# Patient Record
Sex: FEMALE | Race: WHITE | NOT HISPANIC OR LATINO | Employment: OTHER | URBAN - METROPOLITAN AREA
[De-identification: names, ages, dates, MRNs, and addresses within clinical notes are randomized per-mention and may not be internally consistent; named-entity substitution may affect disease eponyms.]

---

## 2020-02-10 NOTE — PROGRESS NOTES
[facility] Cardiology    Subjective:     Encounter Date:02/12/2020         Patient ID: Maryana Freeman is a 64 y.o. female.  Referring provider: No ref. provider found     Reason for consultation:   Chief Complaint   Patient presents with   • Atrial Fibrillation      Problem List:  1. Permanent atrial fibrillation, asymptomatic   a. Diagnosed 2014, incidentally noted.  b. Failed ECV, 6/2014.  c. Rate-controlled with Coreg.  d. CHADSVASC 4, on Xarelto.   2. Non ischemic cardiomyopathy  a. Echocardiogram April 2014: moderate LV dilation, mild to moderately rduced EF of 35-40%, global hypokinesis, left atrial size normal.   b. Lexiscan Cardiolite 4/2014: no ischemia.   c. Started on Coreg, Losartan, 4/2014.     No Known Allergies      Current Outpatient Medications:   •  carvedilol (COREG) 25 MG tablet, Take 25 mg by mouth 2 (Two) Times a Day With Meals., Disp: , Rfl:   •  losartan (COZAAR) 50 MG tablet, Take 50 mg by mouth Daily., Disp: , Rfl:   •  rivaroxaban (XARELTO) 20 MG tablet, Take 20 mg by mouth Daily., Disp: , Rfl:     HPI:      Maryana Freeman is a 64 y.o. female who present today to establish care for her NICMP and permanent atrial fibrillation. She recently lost her  and moved to [place]. Looking to establish care locally. Regarding Afib, she is rate-controlled with Coreg and Anticoagulated with Xarelto. She is asymptomatic from atrial fibrillation. She was started on Coreg and Losartan in 2014 after being noted to have NCIMP. Her last echocardiogram was in 2014.  She rides stationary bikes, light weight resistance 3 days a week, about 30 minutes or more.       Social History     Socioeconomic History   • Marital status:      Spouse name: Not on file   • Number of children: Not on file   • Years of education: Not on file   • Highest education level: Not on file   Tobacco Use   • Smoking status: Never Smoker   • Smokeless tobacco: Never Used   Substance and  Sexual Activity   • Alcohol use: Yes     Alcohol/week: 2.0 - 3.0 standard drinks     Types: 2 - 3 Standard drinks or equivalent per week     Drinks per session: 1 or 2     Binge frequency: Less than monthly   • Sexual activity: Defer     Family History   Problem Relation Age of Onset   • Alzheimer's disease Mother    • No Known Problems Father      Mother- Afib, PPM for SSS  Sister- Afib.     Review of Systems:  The following portions of the patient's history were reviewed and updated as appropriate: allergies, current medications and problem list.    Constitution: Negative for chills, fatigue, fever, generalized weakness, weight gain and weight loss.   Cardiovascular: Negative for chest pain, claudication, dyspnea on exertion, leg swelling, orthopnea, palpitations, paroxysmal nocturnal dyspnea and syncope.   Respiratory: Negative for cough, shortness of breath, and wheezing.  HENT: Negative for ear pain, nosebleeds, and tinnitus.  Gastrointestinal: Negative for abdominal pain, constipation, diarrhea, nausea and vomiting.   Genitourinary: No urinary symptoms   Musculoskeletal: Negative for muscle cramps.  Neurological: Negative for dizziness, headaches, loss of balance, numbness, and symptoms of stroke.  Psychiatric: Normal mental status.         Objective:     Vitals:    02/12/20 1352   BP: 136/86   Pulse: 77   SpO2: 98%     GENERAL: This is a well-developed, well-nourished, female who is in no acute distress. Alert and oriented x3. Normal mood and affect.   SKIN: Pink and warm without rash or abnormality noted.   HEENT: Head is normocephalic and atraumatic. Pupils are equal and reactive to light bilaterally. Mucous membranes are pink and moist.   NECK: Supple without lymphadenopathy or thyromegaly. There is no jugular venous distention at 30°.  LUNGS: Clear to auscultation bilaterally without wheezing, rhonchi, or rales noted.   CARDIOVASCULAR: The heart has a regular rate with a normal S1 and S2. There is no  murmur, gallop, rub, or click appreciated. The PMI is nondisplaced. Carotid upstrokes are 2+ and symmetrical without bruits.   ABDOMEN: Soft and nondistended with positive bowel sounds x4. The patient denies tenderness of palpitation.   MUSCULOSKELETAL: There are no obvious bony abnormalities. Normal range of tenderness to palpation.   NEUROLOGICAL: Nonfocal.   PERIPHERAL VASCULAR: Femoral pulses are 2+ and symmetrical without bruits. Posterior tibial and dorsalis pedis pulses are 2+ and symmetrical. There is no peripheral edema.     No results found for any previous visit.         Diagnostic Data:          ECG 12 Lead  Date/Time: 2/12/2020 2:13 PM  Performed by: Maylin Solares APRN  Authorized by: Maylin Solares APRN   Previous ECG: no previous ECG available  Rhythm: atrial fibrillation  BPM: 77                  Assessment:       ICD-10-CM ICD-9-CM   1. Atrial fibrillation, chronic I48.20 427.31   2. Non-ischemic cardiomyopathy (CMS/HCC) I42.8 425.4          Plan:     1. Continue Xarelto for stroke prevention  2. Continue Coreg for Afib  3. Continue Coreg, Losartan for NICMP.  4. Repeat echocardiogram to reassess LVEF as her last echo was in 2014.  5. Follow-up in 12 months sooner as needed.      Scribed for Brenda Rodgers MD by CORAL Hassan. 2/12/2020  2:12 PM     I Brenda Rodgers MD personally performed the services described in this documentation as scribed by the above individual in my presence, and it is both accurate and complete.    Brenda Rodgers MD, [facility]

## 2020-02-12 ENCOUNTER — CONSULT (OUTPATIENT)
Dept: CARDIOLOGY | Facility: CLINIC | Age: 65
End: 2020-02-12

## 2020-02-12 VITALS
HEIGHT: 64 IN | SYSTOLIC BLOOD PRESSURE: 136 MMHG | BODY MASS INDEX: 37.9 KG/M2 | HEART RATE: 77 BPM | OXYGEN SATURATION: 98 % | WEIGHT: 222 LBS | DIASTOLIC BLOOD PRESSURE: 86 MMHG

## 2020-02-12 DIAGNOSIS — I42.8 NON-ISCHEMIC CARDIOMYOPATHY (HCC): ICD-10-CM

## 2020-02-12 DIAGNOSIS — I48.20 ATRIAL FIBRILLATION, CHRONIC (HCC): Primary | ICD-10-CM

## 2020-02-12 PROCEDURE — 99204 OFFICE O/P NEW MOD 45 MIN: CPT | Performed by: INTERNAL MEDICINE

## 2020-02-12 PROCEDURE — 93000 ELECTROCARDIOGRAM COMPLETE: CPT | Performed by: NURSE PRACTITIONER

## 2020-02-12 RX ORDER — LOSARTAN POTASSIUM 50 MG/1
50 TABLET ORAL DAILY
COMMUNITY
End: 2020-04-15 | Stop reason: SDUPTHER

## 2020-02-12 RX ORDER — CARVEDILOL 25 MG/1
25 TABLET ORAL 2 TIMES DAILY WITH MEALS
COMMUNITY
End: 2020-02-25 | Stop reason: SDUPTHER

## 2020-02-19 ENCOUNTER — HOSPITAL ENCOUNTER (OUTPATIENT)
Dept: CARDIOLOGY | Facility: HOSPITAL | Age: 65
Discharge: HOME | End: 2020-02-19
Admitting: NURSE PRACTITIONER

## 2020-02-19 VITALS — BODY MASS INDEX: 37.9 KG/M2 | HEIGHT: 64 IN | WEIGHT: 222 LBS

## 2020-02-19 DIAGNOSIS — I42.8 NON-ISCHEMIC CARDIOMYOPATHY (HCC): ICD-10-CM

## 2020-02-19 PROCEDURE — 93306 TTE W/DOPPLER COMPLETE: CPT | Performed by: INTERNAL MEDICINE

## 2020-02-19 PROCEDURE — 93306 TTE W/DOPPLER COMPLETE: CPT

## 2020-02-22 LAB
BH CV ECHO MEAS - AO MAX PG (FULL): 3.1 MMHG
BH CV ECHO MEAS - AO MAX PG: 5 MMHG
BH CV ECHO MEAS - AO MEAN PG (FULL): 2 MMHG
BH CV ECHO MEAS - AO MEAN PG: 3 MMHG
BH CV ECHO MEAS - AO V2 MAX: 107 CM/SEC
BH CV ECHO MEAS - AO V2 MEAN: 75.7 CM/SEC
BH CV ECHO MEAS - AO V2 VTI: 23.3 CM
BH CV ECHO MEAS - AVA(I,A): 2.7 CM^2
BH CV ECHO MEAS - AVA(I,D): 2.7 CM^2
BH CV ECHO MEAS - AVA(V,A): 2.7 CM^2
BH CV ECHO MEAS - AVA(V,D): 2.7 CM^2
BH CV ECHO MEAS - BSA(HAYCOCK): 2.2 M^2
BH CV ECHO MEAS - BSA: 2 M^2
BH CV ECHO MEAS - BZI_BMI: 39.3 KILOGRAMS/M^2
BH CV ECHO MEAS - BZI_METRIC_HEIGHT: 160 CM
BH CV ECHO MEAS - BZI_METRIC_WEIGHT: 100.7 KG
BH CV ECHO MEAS - EDV(CUBED): 132.7 ML
BH CV ECHO MEAS - EDV(MOD-SP2): 84 ML
BH CV ECHO MEAS - EDV(MOD-SP4): 97 ML
BH CV ECHO MEAS - EDV(TEICH): 123.8 ML
BH CV ECHO MEAS - EF(CUBED): 75.1 %
BH CV ECHO MEAS - EF(MOD-BP): 60 %
BH CV ECHO MEAS - EF(MOD-SP2): 60.7 %
BH CV ECHO MEAS - EF(MOD-SP4): 58.8 %
BH CV ECHO MEAS - EF(TEICH): 66.7 %
BH CV ECHO MEAS - ESV(CUBED): 33.1 ML
BH CV ECHO MEAS - ESV(MOD-SP2): 33 ML
BH CV ECHO MEAS - ESV(MOD-SP4): 40 ML
BH CV ECHO MEAS - ESV(TEICH): 41.3 ML
BH CV ECHO MEAS - FS: 37.1 %
BH CV ECHO MEAS - IVS/LVPW: 1
BH CV ECHO MEAS - IVSD: 1 CM
BH CV ECHO MEAS - LA DIMENSION: 4.1 CM
BH CV ECHO MEAS - LAD MAJOR: 5.9 CM
BH CV ECHO MEAS - LAT PEAK E' VEL: 11.5 CM/SEC
BH CV ECHO MEAS - LATERAL E/E' RATIO: 7.8
BH CV ECHO MEAS - LV DIASTOLIC VOL/BSA (35-75): 48 ML/M^2
BH CV ECHO MEAS - LV MASS(C)D: 187 GRAMS
BH CV ECHO MEAS - LV MASS(C)DI: 92.5 GRAMS/M^2
BH CV ECHO MEAS - LV MAX PG: 1.9 MMHG
BH CV ECHO MEAS - LV MEAN PG: 1 MMHG
BH CV ECHO MEAS - LV SYSTOLIC VOL/BSA (12-30): 19.8 ML/M^2
BH CV ECHO MEAS - LV V1 MAX: 68.4 CM/SEC
BH CV ECHO MEAS - LV V1 MEAN: 47.2 CM/SEC
BH CV ECHO MEAS - LV V1 VTI: 15.1 CM
BH CV ECHO MEAS - LVIDD: 5.1 CM
BH CV ECHO MEAS - LVIDS: 3.2 CM
BH CV ECHO MEAS - LVLD AP2: 7.2 CM
BH CV ECHO MEAS - LVLD AP4: 7 CM
BH CV ECHO MEAS - LVLS AP2: 5.5 CM
BH CV ECHO MEAS - LVLS AP4: 5.8 CM
BH CV ECHO MEAS - LVOT AREA (M): 4.2 CM^2
BH CV ECHO MEAS - LVOT AREA: 4.2 CM^2
BH CV ECHO MEAS - LVOT DIAM: 2.3 CM
BH CV ECHO MEAS - LVPWD: 0.98 CM
BH CV ECHO MEAS - MED PEAK E' VEL: 6 CM/SEC
BH CV ECHO MEAS - MEDIAL E/E' RATIO: 14.9
BH CV ECHO MEAS - MV DEC TIME: 0.2 SEC
BH CV ECHO MEAS - MV E MAX VEL: 89.8 CM/SEC
BH CV ECHO MEAS - PA ACC SLOPE: 728 CM/SEC^2
BH CV ECHO MEAS - PA ACC TIME: 0.11 SEC
BH CV ECHO MEAS - PA MAX PG: 2.7 MMHG
BH CV ECHO MEAS - PA PR(ACCEL): 30 MMHG
BH CV ECHO MEAS - PA V2 MAX: 82.8 CM/SEC
BH CV ECHO MEAS - RAP SYSTOLE: 3 MMHG
BH CV ECHO MEAS - RVSP: 28 MMHG
BH CV ECHO MEAS - SI(CUBED): 49.3 ML/M^2
BH CV ECHO MEAS - SI(LVOT): 31 ML/M^2
BH CV ECHO MEAS - SI(MOD-SP2): 25.2 ML/M^2
BH CV ECHO MEAS - SI(MOD-SP4): 28.2 ML/M^2
BH CV ECHO MEAS - SI(TEICH): 40.8 ML/M^2
BH CV ECHO MEAS - SV(CUBED): 99.6 ML
BH CV ECHO MEAS - SV(LVOT): 62.7 ML
BH CV ECHO MEAS - SV(MOD-SP2): 51 ML
BH CV ECHO MEAS - SV(MOD-SP4): 57 ML
BH CV ECHO MEAS - SV(TEICH): 82.5 ML
BH CV ECHO MEAS - TAPSE (>1.6): 3.5 CM2
BH CV ECHO MEAS - TR MAX PG: 25 MMHG
BH CV ECHO MEAS - TR MAX VEL: 247.7 CM/SEC
BH CV ECHO MEASUREMENTS AVERAGE E/E' RATIO: 10.26
BH CV VAS BP RIGHT ARM: NORMAL MMHG
BH CV XLRA - RV BASE: 4 CM
BH CV XLRA - RV LENGTH: 6.1 CM
BH CV XLRA - RV MID: 3.8 CM
BH CV XLRA - TDI S': 16.3 CM/SEC
LEFT ATRIUM VOLUME INDEX: 31.7 ML/M^2
LEFT ATRIUM VOLUME: 64 ML
LV EF 2D ECHO EST: 55 %

## 2020-02-25 RX ORDER — CARVEDILOL 25 MG/1
25 TABLET ORAL 2 TIMES DAILY WITH MEALS
Qty: 180 TABLET | Refills: 3 | Status: SHIPPED | OUTPATIENT
Start: 2020-02-25 | End: 2021-02-03 | Stop reason: SDUPTHER

## 2020-04-15 RX ORDER — LOSARTAN POTASSIUM 50 MG/1
50 TABLET ORAL DAILY
Qty: 90 TABLET | Refills: 3 | Status: SHIPPED | OUTPATIENT
Start: 2020-04-15 | End: 2021-04-30 | Stop reason: SDUPTHER

## 2021-02-04 RX ORDER — CARVEDILOL 25 MG/1
25 TABLET ORAL 2 TIMES DAILY WITH MEALS
Qty: 180 TABLET | Refills: 0 | Status: SHIPPED | OUTPATIENT
Start: 2021-02-04 | End: 2021-04-30 | Stop reason: SDUPTHER

## 2021-03-11 ENCOUNTER — OFFICE VISIT (OUTPATIENT)
Dept: CARDIOLOGY | Facility: CLINIC | Age: 66
End: 2021-03-11

## 2021-03-11 VITALS
HEIGHT: 64 IN | BODY MASS INDEX: 36.5 KG/M2 | TEMPERATURE: 97.5 F | DIASTOLIC BLOOD PRESSURE: 76 MMHG | WEIGHT: 213.8 LBS | SYSTOLIC BLOOD PRESSURE: 140 MMHG | HEART RATE: 78 BPM | OXYGEN SATURATION: 100 %

## 2021-03-11 DIAGNOSIS — I48.20 ATRIAL FIBRILLATION, CHRONIC (HCC): Primary | ICD-10-CM

## 2021-03-11 DIAGNOSIS — I42.8 NON-ISCHEMIC CARDIOMYOPATHY (HCC): ICD-10-CM

## 2021-03-11 PROCEDURE — 99214 OFFICE O/P EST MOD 30 MIN: CPT | Performed by: INTERNAL MEDICINE

## 2021-03-11 NOTE — PROGRESS NOTES
"[facility] Cardiology    Patient ID: Maryana Freeman is a 65 y.o. female.  : 1955   Contact: [phone]    Encounter date: 2021    PCP: Astrid Smalls MD      Chief complaint:   Chief Complaint   Patient presents with   • Non ischemic cardiomyopathy       Problem List:  1. Permanent atrial fibrillation, asymptomatic   a. Diagnosed , incidentally noted.  b. Failed ECV, 2014.  c. Rate-controlled with Coreg.  d. CHADSVASC 4, on Xarelto.   2. Non ischemic cardiomyopathy  a. Echocardiogram 2014: moderate LV dilation, mild to moderately rduced EF of 35-40%, global hypokinesis, left atrial size normal.   b. Lexiscan Cardiolite 2014: no ischemia.   c. Started on Coreg, Losartan, 2014.  d. Echocardiogram,  2020: EF: 55%, mild MR and TR.     No Known Allergies    Current Medications:    Current Outpatient Medications:   •  carvedilol (COREG) 25 MG tablet, Take 1 tablet by mouth 2 (Two) Times a Day With Meals., Disp: 180 tablet, Rfl: 0  •  losartan (COZAAR) 50 MG tablet, Take 1 tablet by mouth Daily., Disp: 90 tablet, Rfl: 3  •  rivaroxaban (XARELTO) 20 MG tablet, Take 1 tablet by mouth Daily., Disp: 90 tablet, Rfl: 3    HPI    Maryana Freeman is a 65 y.o. female who presents today for an annual follow up of AFIB and non-ischemic cardiomyopathy. Since last visit, her BP has been \"higher than normal\" since she has stopped exercising. She notes that she used to walk regularly and exercise at the [facility] 3 times per week and plans to begin this routine again. Patient otherwise denies chest pain, shortness of breath, PND, edema, palpitations, syncope, or presyncope at this time.       The following portions of the patient's history were reviewed and updated as appropriate: allergies, current medications and problem list.    Pertinent positives as listed in the HPI.  All other systems reviewed are negative.         Vitals:    21 0915   BP: 140/76   BP " "Location: Left arm   Patient Position: Sitting   Pulse: 78   Temp: 97.5 °F (36.4 °C)   SpO2: 100%   Weight: 97 kg (213 lb 12.8 oz)   Height: 162.6 cm (64\")       Physical Exam:  General: Alert and oriented.  Neck: Jugular venous pressure is within normal limits. Carotids have normal upstrokes without bruits.   Cardiovascular: Heart has a nondisplaced focal PMI. Irregular rate and rhythm. No murmur, gallop or rub.  Lungs: Clear, no rales or wheezes. Equal expansion is noted.   Extremities: Show no edema.  Skin: Warm and dry.  Neurologic: Nonfocal.     Diagnostic Data (reviewed with patient):  No recent laboratory studies to review.     Procedures    Advance Care Planning   ACP discussion was held with the patient during this visit. Patient has an advance directive in EMR which is still valid.       Assessment:    ICD-10-CM ICD-9-CM   1. Atrial fibrillation, chronic (CMS/HCC)  I48.20 427.31   2. Non-ischemic cardiomyopathy (CMS/[facility])  I42.8 425.4         Plan:  1. Begin routine aerobic exercise for at least 30 minutes 5 days per week.   2. Begin checking BP at home and report levels to office. If systolic pressure remains above 140 torr consider increasing losartan to 100 mg.    3. Continue carvedilol 25mg for AFIB and cardiomyopathy.  4. Continue losartan 50mg for hypertension.   5. Continue Xarelto for stroke prophylaxis.   6. Continue all other current medications.  7. F/up in 12 months, sooner if needed.    Scribed for Brenda Rodgers MD by Diaz Hodge. 3/11/2021  09:38 EST     I Brenda Rodgers MD personally performed the services described in this documentation as scribed by the above individual in my presence, and it is both accurate and complete.    Brenda Rodgers MD, FACC              "

## 2021-05-03 RX ORDER — LOSARTAN POTASSIUM 50 MG/1
50 TABLET ORAL DAILY
Qty: 90 TABLET | Refills: 3 | Status: SHIPPED | OUTPATIENT
Start: 2021-05-03 | End: 2022-03-23

## 2021-05-03 RX ORDER — CARVEDILOL 25 MG/1
25 TABLET ORAL 2 TIMES DAILY WITH MEALS
Qty: 180 TABLET | Refills: 0 | Status: SHIPPED | OUTPATIENT
Start: 2021-05-03 | End: 2021-07-26

## 2021-07-26 NOTE — TELEPHONE ENCOUNTER
Labs requested from PCP - labs received are from 3/2020 - will need repeat labs  Next appt 3/2022   Preventing Falls: After Your Visit Your Care Instructions Getting around your home safely can be a challenge if you have injuries or health problems that make it easy for you to fall. Loose rugs and furniture in walkways are among the dangers for many older people who have problems walking or who have poor eyesight. People who have conditions such as arthritis, osteoporosis, or dementia also have to be careful not to fall. You can make your home safer with a few simple measures. Follow-up care is a key part of your treatment and safety. Be sure to make and go to all appointments, and call your doctor if you are having problems. It's also a good idea to know your test results and keep a list of the medicines you take. How can you care for yourself at home? Taking care of yourself You may get dizzy if you do not drink enough water. To prevent dehydration, drink plenty of fluids, enough so that your urine is light yellow or clear like water. Choose water and other caffeine-free clear liquids. If you have kidney, heart, or liver disease and have to limit fluids, talk with your doctor before you increase the amount of fluids you drink. Exercise regularly to improve your strength, muscle tone, and balance. Walk if you can. Swimming may be a good choice if you cannot walk easily. Have your vision and hearing checked each year or any time you notice a change. If you have trouble seeing and hearing, you might not be able to avoid objects and could lose your balance. Know the side effects of the medicines you take. Ask your doctor or pharmacist whether the medicines you take can affect your balance. Sleeping pills or sedatives can affect your balance. Limit the amount of alcohol you drink. Alcohol can impair your balance and other senses. Ask your doctor whether calluses or corns on your feet need to be removed.  If you wear loose-fitting shoes because of calluses or corns, you can lose your balance and fall. Talk to your doctor if you have numbness in your feet. Preventing falls at home Remove raised doorway thresholds, throw rugs, and clutter. Repair loose carpet or raised areas in the floor. Move furniture and electrical cords to keep them out of walking paths. Use nonskid floor wax, and wipe up spills right away, especially on ceramic tile floors. If you use a walker or cane, put rubber tips on it. If you use crutches, clean the bottoms of them regularly with an abrasive pad, such as steel wool. Keep your house well lit, especially stairways, porches, and outside walkways. Use night-lights in areas such as hallways and bathrooms. Add extra light switches or use remote switches (such as switches that go on or off when you clap your hands) to make it easier to turn lights on if you have to get up during the night. Install sturdy handrails on stairways. Move items in your cabinets so that the things you use a lot are on the lower shelves (about waist level). Keep a cordless phone and a flashlight with new batteries by your bed. If possible, put a phone in each of the main rooms of your house, or carry a cell phone in case you fall and cannot reach a phone. Or, you can wear a device around your neck or wrist. You push a button that sends a signal for help. Wear low-heeled shoes that fit well and give your feet good support. Use footwear with nonskid soles. Check the heels and soles of your shoes for wear. Repair or replace worn heels or soles. Do not wear socks without shoes on wood floors. Walk on the grass when the sidewalks are slippery. If you live in an area that gets snow and ice in the winter, sprinkle salt on slippery steps and sidewalks. Preventing falls in the bath Install grab bars and nonskid mats inside and outside your shower or tub and near the toilet and sinks. Use shower chairs and bath benches. Use a hand-held shower head that will allow you to sit while showering. Get into a tub or shower by putting the weaker leg in first. Get out of a tub or shower with your strong side first.  
Repair loose toilet seats and consider installing a raised toilet seat to make getting on and off the toilet easier. Keep your bathroom door unlocked while you are in the shower. Where can you learn more? Go to [employer] Enter [phone] in the search box to learn more about \"Preventing Falls: After Your Visit. \"   
© 4060-3634 Healthwise, [employer]orporated. Care instructions adapted under license by [employer] (which disclaims liability or warranty for this information). This care instruction is for use with your licensed healthcare professional. If you have questions about a medical condition or this instruction, always ask your healthcare professional. [place] [address] any warranty or liability for your use of this information. Content Version: 10.1.615376; Current as of: May 15, 2013 Thank you for choosing [employer] and [facility] for your  
 
care. You may receive a survey about your visit. We appreciate you taking time  
 
to complete this survey as we use your feedback to improve our services. We  
 
realize we are not perfect, but we strive to provide excellent care.

## 2021-07-27 RX ORDER — CARVEDILOL 25 MG/1
TABLET ORAL
Qty: 180 TABLET | Refills: 2 | Status: SHIPPED | OUTPATIENT
Start: 2021-07-27 | End: 2022-03-23 | Stop reason: SDUPTHER

## 2022-03-23 ENCOUNTER — OFFICE VISIT (OUTPATIENT)
Dept: CARDIOLOGY | Facility: CLINIC | Age: 67
End: 2022-03-23

## 2022-03-23 VITALS
BODY MASS INDEX: 37.05 KG/M2 | WEIGHT: 217 LBS | SYSTOLIC BLOOD PRESSURE: 164 MMHG | DIASTOLIC BLOOD PRESSURE: 82 MMHG | HEART RATE: 79 BPM | HEIGHT: 64 IN | OXYGEN SATURATION: 98 %

## 2022-03-23 DIAGNOSIS — I48.20 ATRIAL FIBRILLATION, CHRONIC: Primary | ICD-10-CM

## 2022-03-23 DIAGNOSIS — I42.8 NON-ISCHEMIC CARDIOMYOPATHY: ICD-10-CM

## 2022-03-23 PROCEDURE — 93000 ELECTROCARDIOGRAM COMPLETE: CPT | Performed by: NURSE PRACTITIONER

## 2022-03-23 PROCEDURE — 99214 OFFICE O/P EST MOD 30 MIN: CPT | Performed by: NURSE PRACTITIONER

## 2022-03-23 RX ORDER — CARVEDILOL 25 MG/1
25 TABLET ORAL 2 TIMES DAILY WITH MEALS
Qty: 180 TABLET | Refills: 3 | Status: SHIPPED | OUTPATIENT
Start: 2022-03-23

## 2022-03-23 RX ORDER — SACCHAROMYCES BOULARDII 250 MG
250 CAPSULE ORAL DAILY
COMMUNITY

## 2022-03-23 RX ORDER — LOSARTAN POTASSIUM 100 MG/1
100 TABLET ORAL DAILY
Qty: 30 TABLET | Refills: 11 | Status: SHIPPED | OUTPATIENT
Start: 2022-03-23 | End: 2023-03-31 | Stop reason: DRUGHIGH

## 2022-03-23 NOTE — PROGRESS NOTES
[facility] Cardiology    Patient ID: Maryana Freeman is a 66 y.o. female.  : 1955   Contact: [phone]    Encounter date: 2022    PCP: Astrid Smalls MD      Chief complaint:   Chief Complaint   Patient presents with   • Atrial fibrillation, chronic (CMS/HCC)     Problem List:  1. Permanent atrial fibrillation, asymptomatic   a. Diagnosed , incidentally noted.  b. Failed ECV, 2014.  c. Rate-controlled with Coreg.  d. CHADSVASC 4, on Xarelto.   2. Non ischemic cardiomyopathy  a. Echocardiogram 2014: moderate LV dilation, mild to moderately rduced EF of 35-40%, global hypokinesis, left atrial size normal.   b. Lexiscan Cardiolite 2014: no ischemia.   c. Started on Coreg, Losartan, 2014.  d. Echocardiogram,  2020: EF: 55%, mild MR and TR.   3. Hypertension     No Known Allergies    Current Medications:    Current Outpatient Medications:   •  carvedilol (COREG) 25 MG tablet, TAKE 1 TABLET BY MOUTH TWICE A DAY WITH MEALS, Disp: 180 tablet, Rfl: 2  •  losartan (COZAAR) 50 MG tablet, Take 1 tablet by mouth Daily., Disp: 90 tablet, Rfl: 3  •  rivaroxaban (XARELTO) 20 MG tablet, Take 1 tablet by mouth Daily., Disp: 90 tablet, Rfl: 3  •  saccharomyces boulardii (FLORASTOR) 250 MG capsule, Take 250 mg by mouth Daily., Disp: , Rfl:     HPI     Maryana Freeman is a 66 y.o. female who presents today for an annual follow up of chronic atrial fibrillation, nonischemic cardiomyopathy, and cardiac risk factors. Since last visit, patient has done well overall. No chest pain, SOA/MEZA, PND, orthopnea, edema, palpitations. BP has been mostly in the 130's or higher systolic. No major bleeding or bruising on Xarelto. No TIA/CVA like symptoms.        The following portions of the patient's history were reviewed and updated as appropriate: allergies, current medications and problem list.    Pertinent positives as listed in the HPI.  All other systems reviewed are  "negative.         Vitals:    03/23/22 1334   BP: 164/82   BP Location: Left arm   Patient Position: Sitting   Cuff Size: Adult   Pulse: 79   SpO2: 98%   Weight: 98.4 kg (217 lb)   Height: 162.6 cm (64\")       Physical Exam:  General: Alert and oriented.  Neck: Jugular venous pressure is within normal limits. Carotids have normal upstrokes without bruits.   Cardiovascular: Heart has a nondisplaced focal PMI. Regular rate and rhythm. No murmur, gallop or rub.  Lungs: Clear, no rales or wheezes. Equal expansion is noted.   Extremities: Show no edema.  Skin: Warm and dry.  Neurologic: Nonfocal.     Diagnostic Data (reviewed with patient):        ECG 12 Lead    Date/Time: 3/23/2022 1:50 PM  Performed by: Maylin Solares APRN  Authorized by: Maylin Solares APRN   Comparison: compared with previous ECG from 2/12/2020  Rhythm: atrial fibrillation  BPM: 79              Assessment:    ICD-10-CM ICD-9-CM   1. Atrial fibrillation, chronic (HCC)  I48.20 427.31   2. Non-ischemic cardiomyopathy (HCC)  I42.8 425.4         Plan:  1. Continue on carvedilol 25 mg BID for rate control, hypertension, and NICM.   2. Increase losartan 100 mg daily for hypertension and NICM.   3. Continue on Xarelto 20 mg daily for stroke prophylaxis.   4. Continue all other current medications.  5. F/up in 12 months, sooner if needed.      Electronically signed by CORAL Wilhelm, 03/23/22, 1:51 PM EDT.              "

## 2022-04-18 RX ORDER — LOSARTAN POTASSIUM 50 MG/1
TABLET ORAL
Qty: 90 TABLET | Refills: 3 | Status: SHIPPED | OUTPATIENT
Start: 2022-04-18 | End: 2023-03-31 | Stop reason: DRUGHIGH

## 2023-03-31 RX ORDER — LOSARTAN POTASSIUM 100 MG/1
100 TABLET ORAL DAILY
Qty: 90 TABLET | Refills: 3 | Status: SHIPPED | OUTPATIENT
Start: 2023-03-31

## 2023-04-12 NOTE — PROGRESS NOTES
[facility] Cardiology    Patient ID: Maryana Freeman is a 67 y.o. female.  : 1955   Contact: [phone]    Encounter date: 2023    PCP: Astrid Smalls MD      Chief complaint:   Chief Complaint   Patient presents with   • Atrial fibrillation, chronic       Problem List:  1. Permanent atrial fibrillation, asymptomatic   a. Diagnosed , incidentally noted.  b. Failed ECV, 2014.  c. Rate-controlled with Coreg.  d. CHADSVASC 4, on Xarelto.   2. Non ischemic cardiomyopathy  a. Echocardiogram 2014: moderate LV dilation, mild to moderately rduced EF of 35-40%, global hypokinesis, left atrial size normal.   b. Lexiscan Cardiolite 2014: no ischemia.   c. Started on Coreg, Losartan, 2014.  d. Echocardiogram,  2020: EF: 55%, mild MR and TR.   3. Hypertension     No Known Allergies    Current Medications:    Current Outpatient Medications:   •  carvedilol (COREG) 25 MG tablet, Take 1 tablet by mouth 2 (Two) Times a Day With Meals., Disp: 180 tablet, Rfl: 3  •  losartan (COZAAR) 100 MG tablet, Take 1 tablet by mouth Daily., Disp: 90 tablet, Rfl: 3  •  probiotic (CULTURELLE) capsule capsule, Take 1 capsule by mouth Daily., Disp: , Rfl:   •  rivaroxaban (XARELTO) 20 MG tablet, Take 1 tablet by mouth Daily., Disp: 90 tablet, Rfl: 3    HPI    Maryana Freeman is a 67 y.o. female who presents today for an annual follow up of chronic atrial fibrillation, nonischemic cardiomyopathy, and cardiac risk factors. Since last visit, she has been doing well and has no cardiac complaints today. Her blood pressure has been a little elevated in the upper 130s-140s systolic and 80s diastolic when she has been to her PCP. She does not take her blood pressure at home. She walks on [place] in her neighborhood 2-3x/week for 25 minutes. She admits she hasn't been as active as she was prior to COVID.  She has been taking her medications as prescribed and denies any side  "effects.  She has been tolerating the increase in her losartan to 100 mg daily well.  She denies any chest pain, palpitations, shortness of breath, presyncope, syncope, abdominal discomfort, numbness or tingling. She had her annual blood work sent over for our records by her PCP.      The following portions of the patient's history were reviewed and updated as appropriate: allergies, current medications and problem list.    Pertinent positives as listed in the HPI.  All other systems reviewed are negative.         Vitals:    04/13/23 0927   BP: 140/84   BP Location: Left arm   Patient Position: Sitting   Pulse: 77   SpO2: 97%   Weight: 98.1 kg (216 lb 3.2 oz)   Height: 162.6 cm (64\")       Physical Exam:  General: Alert and oriented.  Neck: Jugular venous pressure is within normal limits. Carotids have normal upstrokes without bruits.   Cardiovascular: Heart has a nondisplaced focal PMI. Irregular rate and rhythm. No murmur, gallop or rub.  Lungs: Clear, no rales or wheezes. Equal expansion is noted.   Extremities: Show no edema, pulses palpable  Skin: Warm and dry.  Neurologic: Nonfocal.     Diagnostic Data (reviewed with patient):    Lab date: 12/30/2022  • FLP: , TG 98, HDL 50, LDL 80  • CMP: Glu 101, BUN 10, Creat 0.79, Na 139, K 4.2, Cl 102, CO2 27, Ca 9.3, Alk Phos 76, AST 35, ALT 43  • CBC: WBC 6.0, RBC 4.151, HGB 12.1, HCT 35.1, MCV 85, MCH 29,   • TSH: 4.0       ECG 12 Lead    Date/Time: 4/13/2023 9:51 AM  Performed by: Imelda Hinojosa PA-C  Authorized by: Imelda Hinojosa PA-C   Comparison: compared with previous ECG from 3/23/2022  Similar to previous ECG  Rhythm: atrial fibrillation  Rate: normal  BPM: 76    Clinical impression: abnormal EKG              Assessment:    ICD-10-CM ICD-9-CM   1. Non-ischemic cardiomyopathy  I42.8 425.4   2. Essential hypertension  I10 401.9   3. Permanent atrial fibrillation  I48.21 427.31         Plan:    1. Encouraged to buy a new blood pressure cuff and " take her blood pressure daily at home and call our office with the readings after a week so we can see if we need to adjust her hypertension medications.  2. Consider echocardiogram at next years's visit to evaluate her heart and valvular function due to her history of nonischemic cardiomyopathy with reduced EF of 35-40% in 2014, normalized in 2020 to 55%  3. Continue on carvedilol 25 mg BID and losartan 100 mg daily for hypertension.   4. Continue on Xarelto 20 mg daily for stroke prophylaxis.    5. Continue heart healthy lifestyle, including regular exercise 4-5 times a week for at least 30 minutes, and follow a heart healthy diet.   6. Strongly encouraged patient to maintain hydration by drinking 6-8 glasses of water a day and decrease soda/caffeine consumption.  7. Continue all other current medications.  8. F/up in 12 months, sooner if needed.      Imelda Hinojosa PA-C functioning as a scribe for Brenda Rodgers MD, FACC.    I Brenda Rodgers MD personally performed the services described in this documentation as scribed by the above individual in my presence, and it is both accurate and complete.    Brenda Rodgers MD, FACC

## 2023-04-13 ENCOUNTER — OFFICE VISIT (OUTPATIENT)
Dept: CARDIOLOGY | Facility: CLINIC | Age: 68
End: 2023-04-13
Payer: MEDICARE

## 2023-04-13 VITALS
SYSTOLIC BLOOD PRESSURE: 140 MMHG | OXYGEN SATURATION: 97 % | HEIGHT: 64 IN | BODY MASS INDEX: 36.91 KG/M2 | DIASTOLIC BLOOD PRESSURE: 84 MMHG | WEIGHT: 216.2 LBS | HEART RATE: 77 BPM

## 2023-04-13 DIAGNOSIS — I48.21 PERMANENT ATRIAL FIBRILLATION: ICD-10-CM

## 2023-04-13 DIAGNOSIS — I42.8 NON-ISCHEMIC CARDIOMYOPATHY: Primary | ICD-10-CM

## 2023-04-13 DIAGNOSIS — I10 ESSENTIAL HYPERTENSION: ICD-10-CM

## 2023-04-13 RX ORDER — LACTOBACILLUS RHAMNOSUS GG 10B CELL
1 CAPSULE ORAL DAILY
COMMUNITY

## 2023-04-18 RX ORDER — CARVEDILOL 25 MG/1
25 TABLET ORAL 2 TIMES DAILY WITH MEALS
Qty: 180 TABLET | Refills: 3 | Status: SHIPPED | OUTPATIENT
Start: 2023-04-18

## 2023-05-04 ENCOUNTER — TELEPHONE (OUTPATIENT)
Dept: CARDIOLOGY | Facility: CLINIC | Age: 68
End: 2023-05-04
Payer: MEDICARE

## 2023-05-04 DIAGNOSIS — Z79.899 LONG-TERM USE OF HIGH-RISK MEDICATION: ICD-10-CM

## 2023-05-04 DIAGNOSIS — I10 ESSENTIAL HYPERTENSION: Primary | ICD-10-CM

## 2023-05-04 NOTE — TELEPHONE ENCOUNTER
"Dr. Rodgers patient:     PT walked in to the office to have her BP checked because she said that her readings have \"been all over the place\" at home.     Her machine in the office read BP as 187/112 HR 70's but when retaken by the MA, it was 157/96, HR 70's.     Readings after her office visit were 142/92, 161/100, 162/92, 135/83-     She has never contacted the office with readings before walking in today.     Currently on coreg 25 mg BID and losartan 100 mg daily   Labs from 12/2022 scanned in chart. NKDA listed     "

## 2023-05-04 NOTE — TELEPHONE ENCOUNTER
I would add Spironolactone 25mg daily and have her check her BP after starting that. She will need to have her BMP checked in 1-2 weeks to check her Cr and potassium.

## 2023-05-05 RX ORDER — SPIRONOLACTONE 25 MG/1
25 TABLET ORAL DAILY
Qty: 90 TABLET | Refills: 0 | Status: SHIPPED | OUTPATIENT
Start: 2023-05-05

## 2023-05-05 NOTE — TELEPHONE ENCOUNTER
Discussed with pt. She will go 5/15 for labs. She will call back next week with BP and HR update. Pt verbalized understanding.

## 2023-05-15 ENCOUNTER — LAB (OUTPATIENT)
Dept: LAB | Facility: HOSPITAL | Age: 68
End: 2023-05-15
Payer: MEDICARE

## 2023-05-15 DIAGNOSIS — Z79.899 LONG-TERM USE OF HIGH-RISK MEDICATION: ICD-10-CM

## 2023-05-15 DIAGNOSIS — I10 ESSENTIAL HYPERTENSION: ICD-10-CM

## 2023-05-15 LAB
ANION GAP SERPL CALCULATED.3IONS-SCNC: 9.7 MMOL/L (ref 5–15)
BUN SERPL-MCNC: 15 MG/DL (ref 8–23)
BUN/CREAT SERPL: 19.2 (ref 7–25)
CALCIUM SPEC-SCNC: 10.1 MG/DL (ref 8.6–10.5)
CHLORIDE SERPL-SCNC: 103 MMOL/L (ref 98–107)
CO2 SERPL-SCNC: 26.3 MMOL/L (ref 22–29)
CREAT SERPL-MCNC: 0.78 MG/DL (ref 0.57–1)
EGFRCR SERPLBLD CKD-EPI 2021: 82.9 ML/MIN/1.73
GLUCOSE SERPL-MCNC: 94 MG/DL (ref 65–99)
POTASSIUM SERPL-SCNC: 5 MMOL/L (ref 3.5–5.2)
SODIUM SERPL-SCNC: 139 MMOL/L (ref 136–145)

## 2023-05-15 PROCEDURE — 80048 BASIC METABOLIC PNL TOTAL CA: CPT

## 2023-05-15 PROCEDURE — 36415 COLL VENOUS BLD VENIPUNCTURE: CPT

## 2023-05-17 ENCOUNTER — TELEPHONE (OUTPATIENT)
Dept: CARDIOLOGY | Facility: CLINIC | Age: 68
End: 2023-05-17
Payer: MEDICARE

## 2023-05-17 RX ORDER — TERAZOSIN 1 MG/1
1 CAPSULE ORAL NIGHTLY
Qty: 90 CAPSULE | Refills: 2 | Status: SHIPPED | OUTPATIENT
Start: 2023-05-17

## 2023-05-17 NOTE — TELEPHONE ENCOUNTER
Added Hytrin 1 mg Q HS last week-     119/72  76  112/69  69  120/76  73  126/82  69  144/83 70, 128/76- she had a busy day with appointments  118/73  64    Instructed pt to continue current medications and doses. Went over goal BP readings. She will continue to monitor BP and HR a few times per week and call if running greater than 130

## 2023-07-25 RX ORDER — TERAZOSIN 1 MG/1
1 CAPSULE ORAL NIGHTLY
Qty: 90 CAPSULE | Refills: 2 | Status: SHIPPED | OUTPATIENT
Start: 2023-07-25

## 2024-01-30 ENCOUNTER — TELEPHONE (OUTPATIENT)
Dept: CARDIOLOGY | Facility: CLINIC | Age: 69
End: 2024-01-30
Payer: MEDICARE

## 2024-01-30 NOTE — TELEPHONE ENCOUNTER
Patient left a voice mail message.  She states she is having pelvic prolapse surgery and needs cardiac clearance.  She also needs, directions for her Xarelto.  She requests a return call.

## 2024-02-02 NOTE — TELEPHONE ENCOUNTER
Per [facility]- hold Xarelto 3 days. Low risk     Called pt and discussed [facility] recommendations above. Pt verbalizes understanding and agreeable to plan.    Patient is going to call Dr. Chong's office and either get a fax number for us to send a letter, or have their office send us a form to fill out for clearance.

## 2024-03-11 ENCOUNTER — PRE-ADMISSION TESTING (OUTPATIENT)
Dept: PREADMISSION TESTING | Facility: HOSPITAL | Age: 69
End: 2024-03-11
Payer: MEDICARE

## 2024-03-11 VITALS — BODY MASS INDEX: 35.98 KG/M2 | HEIGHT: 64 IN | WEIGHT: 210.76 LBS

## 2024-03-11 DIAGNOSIS — N81.4 UTERINE PROLAPSE: ICD-10-CM

## 2024-03-11 DIAGNOSIS — Z01.818 PREOPERATIVE TESTING: ICD-10-CM

## 2024-03-11 LAB
ANION GAP SERPL CALCULATED.3IONS-SCNC: 7 MMOL/L (ref 5–15)
BASOPHILS # BLD AUTO: 0.04 10*3/MM3 (ref 0–0.2)
BASOPHILS NFR BLD AUTO: 0.7 % (ref 0–1.5)
BUN SERPL-MCNC: 14 MG/DL (ref 8–23)
BUN/CREAT SERPL: 18.7 (ref 7–25)
CALCIUM SPEC-SCNC: 9.5 MG/DL (ref 8.6–10.5)
CHLORIDE SERPL-SCNC: 101 MMOL/L (ref 98–107)
CO2 SERPL-SCNC: 28 MMOL/L (ref 22–29)
CREAT SERPL-MCNC: 0.75 MG/DL (ref 0.57–1)
DEPRECATED RDW RBC AUTO: 41.7 FL (ref 37–54)
EGFRCR SERPLBLD CKD-EPI 2021: 86.3 ML/MIN/1.73
EOSINOPHIL # BLD AUTO: 0.11 10*3/MM3 (ref 0–0.4)
EOSINOPHIL NFR BLD AUTO: 2 % (ref 0.3–6.2)
ERYTHROCYTE [DISTWIDTH] IN BLOOD BY AUTOMATED COUNT: 13.2 % (ref 12.3–15.4)
GLUCOSE SERPL-MCNC: 93 MG/DL (ref 65–99)
HCT VFR BLD AUTO: 37.6 % (ref 34–46.6)
HGB BLD-MCNC: 12.8 G/DL (ref 12–15.9)
IMM GRANULOCYTES # BLD AUTO: 0.06 10*3/MM3 (ref 0–0.05)
IMM GRANULOCYTES NFR BLD AUTO: 1.1 % (ref 0–0.5)
INR PPP: 1.28 (ref 0.89–1.12)
LYMPHOCYTES # BLD AUTO: 1.19 10*3/MM3 (ref 0.7–3.1)
LYMPHOCYTES NFR BLD AUTO: 21.9 % (ref 19.6–45.3)
MCH RBC QN AUTO: 29.4 PG (ref 26.6–33)
MCHC RBC AUTO-ENTMCNC: 34 G/DL (ref 31.5–35.7)
MCV RBC AUTO: 86.2 FL (ref 79–97)
MONOCYTES # BLD AUTO: 0.38 10*3/MM3 (ref 0.1–0.9)
MONOCYTES NFR BLD AUTO: 7 % (ref 5–12)
NEUTROPHILS NFR BLD AUTO: 3.66 10*3/MM3 (ref 1.7–7)
NEUTROPHILS NFR BLD AUTO: 67.3 % (ref 42.7–76)
NRBC BLD AUTO-RTO: 0 /100 WBC (ref 0–0.2)
PLATELET # BLD AUTO: 226 10*3/MM3 (ref 140–450)
PMV BLD AUTO: 9.4 FL (ref 6–12)
POTASSIUM SERPL-SCNC: 4.6 MMOL/L (ref 3.5–5.2)
PROTHROMBIN TIME: 16.1 SECONDS (ref 12.2–14.5)
RBC # BLD AUTO: 4.36 10*6/MM3 (ref 3.77–5.28)
SODIUM SERPL-SCNC: 136 MMOL/L (ref 136–145)
WBC NRBC COR # BLD AUTO: 5.44 10*3/MM3 (ref 3.4–10.8)

## 2024-03-11 PROCEDURE — 36415 COLL VENOUS BLD VENIPUNCTURE: CPT

## 2024-03-11 PROCEDURE — 85025 COMPLETE CBC W/AUTO DIFF WBC: CPT

## 2024-03-11 PROCEDURE — 85610 PROTHROMBIN TIME: CPT

## 2024-03-11 PROCEDURE — 93005 ELECTROCARDIOGRAM TRACING: CPT

## 2024-03-11 PROCEDURE — 80048 BASIC METABOLIC PNL TOTAL CA: CPT

## 2024-03-11 NOTE — PAT
Patient to apply Chlorhexadine wipes  to surgical area (as instructed) the night before procedure and the AM of procedure. Wipes provided.    Per Anesthesia Request, patient instructed not to take their ACE/ARB medications on the AM of surgery.    Patient viewed general PAT education video as instructed in their preoperative information received from their surgeon.  Patient stated the general PAT education video was viewed in its entirety and survey completed.  Copies of PAT general education handouts (Incentive Spirometry, Meds to Beds Program, Patient Belongings, Pre-op skin preparation instructions, Blood Glucose testing, Visitor policy, Surgery FAQ, Code H) distributed to patient if not printed. Education related to the PAT pass and skin preparation for surgery (if applicable) completed in PAT as a reinforcement to PAT education video. Patient instructed to return PAT pass provided today as well as completed skin preparation sheet (if applicable) on the day of procedure.     Additionally if patient had not viewed video yet but intended to view it at home or in our waiting area, then referred them to the handout with QR code/link provided during PAT visit.  Instructed patient to complete survey after viewing the video in its entirety.  Encouraged patient/family to read PAT general education handouts thoroughly and notify PAT staff with any questions or concerns. Patient verbalized understanding of all information and priority content.    Cardiac clearance with ok to hold xarelto 3 days on chart/in epic per Dr Rodgers

## 2024-03-12 ENCOUNTER — ANESTHESIA EVENT (OUTPATIENT)
Dept: PERIOP | Facility: HOSPITAL | Age: 69
End: 2024-03-12
Payer: MEDICARE

## 2024-03-12 RX ORDER — FAMOTIDINE 10 MG/ML
20 INJECTION, SOLUTION INTRAVENOUS ONCE
Status: CANCELLED | OUTPATIENT
Start: 2024-03-12 | End: 2024-03-12

## 2024-03-13 ENCOUNTER — HOSPITAL ENCOUNTER (OUTPATIENT)
Facility: HOSPITAL | Age: 69
Discharge: HOME | End: 2024-03-14
Attending: OBSTETRICS & GYNECOLOGY | Admitting: OBSTETRICS & GYNECOLOGY
Payer: MEDICARE

## 2024-03-13 ENCOUNTER — ANESTHESIA (OUTPATIENT)
Dept: PERIOP | Facility: HOSPITAL | Age: 69
End: 2024-03-13
Payer: MEDICARE

## 2024-03-13 DIAGNOSIS — N81.4 UTERINE PROLAPSE: Primary | ICD-10-CM

## 2024-03-13 DIAGNOSIS — N81.11 MIDLINE CYSTOCELE: ICD-10-CM

## 2024-03-13 DIAGNOSIS — Z01.818 PREOPERATIVE TESTING: ICD-10-CM

## 2024-03-13 PROBLEM — Z90.710 S/P HYSTERECTOMY: Status: ACTIVE | Noted: 2024-03-13

## 2024-03-13 PROBLEM — Z90.710 S/P HYSTERECTOMY: Status: RESOLVED | Noted: 2024-03-13 | Resolved: 2024-03-13

## 2024-03-13 LAB
QT INTERVAL: 362 MS
QTC INTERVAL: 379 MS

## 2024-03-13 PROCEDURE — 25010000002 SUGAMMADEX 200 MG/2ML SOLUTION: Performed by: ANESTHESIOLOGY

## 2024-03-13 PROCEDURE — 63710000001 OXYCODONE-ACETAMINOPHEN 7.5-325 MG TABLET: Performed by: OBSTETRICS & GYNECOLOGY

## 2024-03-13 PROCEDURE — 25010000002 PROPOFOL 10 MG/ML EMULSION: Performed by: NURSE ANESTHETIST, CERTIFIED REGISTERED

## 2024-03-13 PROCEDURE — 25010000002 HEPARIN (PORCINE) PER 1000 UNITS: Performed by: OBSTETRICS & GYNECOLOGY

## 2024-03-13 PROCEDURE — 25810000003 LACTATED RINGERS PER 1000 ML: Performed by: ANESTHESIOLOGY

## 2024-03-13 PROCEDURE — A9270 NON-COVERED ITEM OR SERVICE: HCPCS | Performed by: OBSTETRICS & GYNECOLOGY

## 2024-03-13 PROCEDURE — 25010000002 ONDANSETRON PER 1 MG: Performed by: ANESTHESIOLOGY

## 2024-03-13 PROCEDURE — 25010000002 DEXAMETHASONE PER 1 MG: Performed by: NURSE ANESTHETIST, CERTIFIED REGISTERED

## 2024-03-13 PROCEDURE — 63710000001 LOSARTAN 50 MG TABLET: Performed by: OBSTETRICS & GYNECOLOGY

## 2024-03-13 PROCEDURE — 25010000002 CEFAZOLIN PER 500 MG: Performed by: OBSTETRICS & GYNECOLOGY

## 2024-03-13 PROCEDURE — 88311 DECALCIFY TISSUE: CPT | Performed by: OBSTETRICS & GYNECOLOGY

## 2024-03-13 PROCEDURE — 25010000002 FENTANYL CITRATE (PF) 100 MCG/2ML SOLUTION: Performed by: ANESTHESIOLOGY

## 2024-03-13 PROCEDURE — 88307 TISSUE EXAM BY PATHOLOGIST: CPT | Performed by: OBSTETRICS & GYNECOLOGY

## 2024-03-13 PROCEDURE — 25010000002 GLYCOPYRROLATE 0.4 MG/2ML SOLUTION: Performed by: NURSE ANESTHETIST, CERTIFIED REGISTERED

## 2024-03-13 PROCEDURE — 25010000002 CEFAZOLIN PER 500 MG: Performed by: PHYSICIAN ASSISTANT

## 2024-03-13 PROCEDURE — 63710000001 TERAZOSIN 1 MG CAPSULE: Performed by: OBSTETRICS & GYNECOLOGY

## 2024-03-13 PROCEDURE — 63710000001 CARVEDILOL 12.5 MG TABLET: Performed by: OBSTETRICS & GYNECOLOGY

## 2024-03-13 PROCEDURE — 25010000002 BUPIVACAINE 0.5 % SOLUTION: Performed by: OBSTETRICS & GYNECOLOGY

## 2024-03-13 PROCEDURE — 25810000003 SODIUM CHLORIDE PER 500 ML: Performed by: OBSTETRICS & GYNECOLOGY

## 2024-03-13 PROCEDURE — 25010000002 FENTANYL CITRATE (PF) 50 MCG/ML SOLUTION

## 2024-03-13 PROCEDURE — 25810000003 LACTATED RINGERS PER 1000 ML: Performed by: OBSTETRICS & GYNECOLOGY

## 2024-03-13 RX ORDER — SIMETHICONE 80 MG
80 TABLET,CHEWABLE ORAL 4 TIMES DAILY PRN
Status: DISCONTINUED | OUTPATIENT
Start: 2024-03-13 | End: 2024-03-14 | Stop reason: HOSPADM

## 2024-03-13 RX ORDER — SODIUM CHLORIDE 0.9 % (FLUSH) 0.9 %
10 SYRINGE (ML) INJECTION EVERY 12 HOURS SCHEDULED
Status: DISCONTINUED | OUTPATIENT
Start: 2024-03-13 | End: 2024-03-13 | Stop reason: HOSPADM

## 2024-03-13 RX ORDER — DROPERIDOL 2.5 MG/ML
0.62 INJECTION, SOLUTION INTRAMUSCULAR; INTRAVENOUS ONCE AS NEEDED
Status: DISCONTINUED | OUTPATIENT
Start: 2024-03-13 | End: 2024-03-13 | Stop reason: HOSPADM

## 2024-03-13 RX ORDER — TEMAZEPAM 7.5 MG/1
7.5 CAPSULE ORAL NIGHTLY PRN
Status: DISCONTINUED | OUTPATIENT
Start: 2024-03-13 | End: 2024-03-14 | Stop reason: HOSPADM

## 2024-03-13 RX ORDER — SODIUM CHLORIDE, SODIUM LACTATE, POTASSIUM CHLORIDE, CALCIUM CHLORIDE 600; 310; 30; 20 MG/100ML; MG/100ML; MG/100ML; MG/100ML
9 INJECTION, SOLUTION INTRAVENOUS CONTINUOUS
Status: DISCONTINUED | OUTPATIENT
Start: 2024-03-13 | End: 2024-03-14 | Stop reason: HOSPADM

## 2024-03-13 RX ORDER — NALOXONE HCL 0.4 MG/ML
0.4 VIAL (ML) INJECTION
Status: DISCONTINUED | OUTPATIENT
Start: 2024-03-13 | End: 2024-03-14 | Stop reason: HOSPADM

## 2024-03-13 RX ORDER — PROPOFOL 10 MG/ML
VIAL (ML) INTRAVENOUS AS NEEDED
Status: DISCONTINUED | OUTPATIENT
Start: 2024-03-13 | End: 2024-03-13 | Stop reason: SURG

## 2024-03-13 RX ORDER — SODIUM CHLORIDE 0.9 % (FLUSH) 0.9 %
10 SYRINGE (ML) INJECTION AS NEEDED
Status: DISCONTINUED | OUTPATIENT
Start: 2024-03-13 | End: 2024-03-13 | Stop reason: HOSPADM

## 2024-03-13 RX ORDER — OXYCODONE AND ACETAMINOPHEN 7.5; 325 MG/1; MG/1
1 TABLET ORAL EVERY 4 HOURS PRN
Status: DISCONTINUED | OUTPATIENT
Start: 2024-03-13 | End: 2024-03-14 | Stop reason: HOSPADM

## 2024-03-13 RX ORDER — CARVEDILOL 12.5 MG/1
25 TABLET ORAL 2 TIMES DAILY WITH MEALS
Status: DISCONTINUED | OUTPATIENT
Start: 2024-03-13 | End: 2024-03-14 | Stop reason: HOSPADM

## 2024-03-13 RX ORDER — SODIUM CHLORIDE 9 MG/ML
40 INJECTION, SOLUTION INTRAVENOUS AS NEEDED
Status: DISCONTINUED | OUTPATIENT
Start: 2024-03-13 | End: 2024-03-13 | Stop reason: HOSPADM

## 2024-03-13 RX ORDER — FAMOTIDINE 20 MG/1
20 TABLET, FILM COATED ORAL ONCE
Qty: 1 TABLET | Refills: 0 | Status: COMPLETED | OUTPATIENT
Start: 2024-03-13 | End: 2024-03-13

## 2024-03-13 RX ORDER — LABETALOL HYDROCHLORIDE 5 MG/ML
10 INJECTION, SOLUTION INTRAVENOUS EVERY 4 HOURS PRN
Status: DISCONTINUED | OUTPATIENT
Start: 2024-03-13 | End: 2024-03-14 | Stop reason: HOSPADM

## 2024-03-13 RX ORDER — FENTANYL CITRATE 50 UG/ML
50 INJECTION, SOLUTION INTRAMUSCULAR; INTRAVENOUS
Status: DISCONTINUED | OUTPATIENT
Start: 2024-03-13 | End: 2024-03-13 | Stop reason: HOSPADM

## 2024-03-13 RX ORDER — HEPARIN SODIUM 5000 [USP'U]/ML
5000 INJECTION, SOLUTION INTRAVENOUS; SUBCUTANEOUS EVERY 8 HOURS SCHEDULED
Status: DISCONTINUED | OUTPATIENT
Start: 2024-03-13 | End: 2024-03-13 | Stop reason: HOSPADM

## 2024-03-13 RX ORDER — ACETAMINOPHEN 325 MG/1
650 TABLET ORAL EVERY 4 HOURS PRN
Status: DISCONTINUED | OUTPATIENT
Start: 2024-03-13 | End: 2024-03-14 | Stop reason: HOSPADM

## 2024-03-13 RX ORDER — SODIUM CHLORIDE, SODIUM LACTATE, POTASSIUM CHLORIDE, CALCIUM CHLORIDE 600; 310; 30; 20 MG/100ML; MG/100ML; MG/100ML; MG/100ML
100 INJECTION, SOLUTION INTRAVENOUS CONTINUOUS
Status: DISCONTINUED | OUTPATIENT
Start: 2024-03-13 | End: 2024-03-14 | Stop reason: HOSPADM

## 2024-03-13 RX ORDER — PHENAZOPYRIDINE HYDROCHLORIDE 100 MG/1
200 TABLET, FILM COATED ORAL ONCE
Qty: 2 TABLET | Refills: 0 | Status: COMPLETED | OUTPATIENT
Start: 2024-03-13 | End: 2024-03-13

## 2024-03-13 RX ORDER — DEXAMETHASONE SODIUM PHOSPHATE 4 MG/ML
INJECTION, SOLUTION INTRA-ARTICULAR; INTRALESIONAL; INTRAMUSCULAR; INTRAVENOUS; SOFT TISSUE AS NEEDED
Status: DISCONTINUED | OUTPATIENT
Start: 2024-03-13 | End: 2024-03-13 | Stop reason: SURG

## 2024-03-13 RX ORDER — SODIUM CHLORIDE 9 MG/ML
INJECTION, SOLUTION INTRAVENOUS AS NEEDED
Status: DISCONTINUED | OUTPATIENT
Start: 2024-03-13 | End: 2024-03-13 | Stop reason: HOSPADM

## 2024-03-13 RX ORDER — ONDANSETRON 2 MG/ML
4 INJECTION INTRAMUSCULAR; INTRAVENOUS EVERY 6 HOURS PRN
Status: DISCONTINUED | OUTPATIENT
Start: 2024-03-13 | End: 2024-03-13

## 2024-03-13 RX ORDER — MIDAZOLAM HYDROCHLORIDE 1 MG/ML
0.5 INJECTION INTRAMUSCULAR; INTRAVENOUS
Status: DISCONTINUED | OUTPATIENT
Start: 2024-03-13 | End: 2024-03-13 | Stop reason: HOSPADM

## 2024-03-13 RX ORDER — LIDOCAINE HYDROCHLORIDE 10 MG/ML
INJECTION, SOLUTION EPIDURAL; INFILTRATION; INTRACAUDAL; PERINEURAL AS NEEDED
Status: DISCONTINUED | OUTPATIENT
Start: 2024-03-13 | End: 2024-03-13 | Stop reason: SURG

## 2024-03-13 RX ORDER — EPHEDRINE SULFATE 50 MG/ML
INJECTION INTRAVENOUS AS NEEDED
Status: DISCONTINUED | OUTPATIENT
Start: 2024-03-13 | End: 2024-03-13 | Stop reason: SURG

## 2024-03-13 RX ORDER — HEPARIN SODIUM 5000 [USP'U]/ML
5000 INJECTION, SOLUTION INTRAVENOUS; SUBCUTANEOUS EVERY 8 HOURS SCHEDULED
Status: DISCONTINUED | OUTPATIENT
Start: 2024-03-14 | End: 2024-03-14 | Stop reason: HOSPADM

## 2024-03-13 RX ORDER — HEPARIN SODIUM 5000 [USP'U]/ML
INJECTION, SOLUTION INTRAVENOUS; SUBCUTANEOUS AS NEEDED
Status: DISCONTINUED | OUTPATIENT
Start: 2024-03-13 | End: 2024-03-13 | Stop reason: HOSPADM

## 2024-03-13 RX ORDER — LIDOCAINE HYDROCHLORIDE 10 MG/ML
0.5 INJECTION, SOLUTION EPIDURAL; INFILTRATION; INTRACAUDAL; PERINEURAL ONCE AS NEEDED
Status: COMPLETED | OUTPATIENT
Start: 2024-03-13 | End: 2024-03-13

## 2024-03-13 RX ORDER — HYDROMORPHONE HYDROCHLORIDE 1 MG/ML
0.5 INJECTION, SOLUTION INTRAMUSCULAR; INTRAVENOUS; SUBCUTANEOUS
Status: DISCONTINUED | OUTPATIENT
Start: 2024-03-13 | End: 2024-03-13 | Stop reason: HOSPADM

## 2024-03-13 RX ORDER — ROCURONIUM BROMIDE 10 MG/ML
INJECTION, SOLUTION INTRAVENOUS AS NEEDED
Status: DISCONTINUED | OUTPATIENT
Start: 2024-03-13 | End: 2024-03-13 | Stop reason: SURG

## 2024-03-13 RX ORDER — TERAZOSIN 1 MG/1
1 CAPSULE ORAL NIGHTLY
Status: DISCONTINUED | OUTPATIENT
Start: 2024-03-13 | End: 2024-03-14 | Stop reason: HOSPADM

## 2024-03-13 RX ORDER — MORPHINE SULFATE 2 MG/ML
1 INJECTION, SOLUTION INTRAMUSCULAR; INTRAVENOUS EVERY 4 HOURS PRN
Status: DISCONTINUED | OUTPATIENT
Start: 2024-03-13 | End: 2024-03-14 | Stop reason: HOSPADM

## 2024-03-13 RX ORDER — ONDANSETRON 2 MG/ML
4 INJECTION INTRAMUSCULAR; INTRAVENOUS EVERY 6 HOURS PRN
Status: DISCONTINUED | OUTPATIENT
Start: 2024-03-13 | End: 2024-03-14 | Stop reason: HOSPADM

## 2024-03-13 RX ORDER — FENTANYL CITRATE 50 UG/ML
INJECTION, SOLUTION INTRAMUSCULAR; INTRAVENOUS
Status: COMPLETED
Start: 2024-03-13 | End: 2024-03-13

## 2024-03-13 RX ORDER — MAGNESIUM HYDROXIDE 1200 MG/15ML
LIQUID ORAL AS NEEDED
Status: DISCONTINUED | OUTPATIENT
Start: 2024-03-13 | End: 2024-03-13 | Stop reason: HOSPADM

## 2024-03-13 RX ORDER — ONDANSETRON 4 MG/1
4 TABLET, ORALLY DISINTEGRATING ORAL EVERY 6 HOURS PRN
Status: DISCONTINUED | OUTPATIENT
Start: 2024-03-13 | End: 2024-03-14 | Stop reason: HOSPADM

## 2024-03-13 RX ORDER — POLYETHYLENE GLYCOL 3350 17 G/17G
17 POWDER, FOR SOLUTION ORAL DAILY PRN
Status: DISCONTINUED | OUTPATIENT
Start: 2024-03-13 | End: 2024-03-14 | Stop reason: HOSPADM

## 2024-03-13 RX ORDER — FENTANYL CITRATE 50 UG/ML
INJECTION, SOLUTION INTRAMUSCULAR; INTRAVENOUS AS NEEDED
Status: DISCONTINUED | OUTPATIENT
Start: 2024-03-13 | End: 2024-03-13 | Stop reason: SURG

## 2024-03-13 RX ORDER — GLYCOPYRROLATE 0.2 MG/ML
INJECTION INTRAMUSCULAR; INTRAVENOUS AS NEEDED
Status: DISCONTINUED | OUTPATIENT
Start: 2024-03-13 | End: 2024-03-13 | Stop reason: SURG

## 2024-03-13 RX ORDER — LOSARTAN POTASSIUM 50 MG/1
100 TABLET ORAL DAILY
Status: DISCONTINUED | OUTPATIENT
Start: 2024-03-13 | End: 2024-03-14 | Stop reason: HOSPADM

## 2024-03-13 RX ORDER — ACETAMINOPHEN 160 MG/5ML
650 SOLUTION ORAL EVERY 4 HOURS PRN
Status: DISCONTINUED | OUTPATIENT
Start: 2024-03-13 | End: 2024-03-14 | Stop reason: HOSPADM

## 2024-03-13 RX ORDER — BUPIVACAINE HYDROCHLORIDE 5 MG/ML
INJECTION, SOLUTION PERINEURAL AS NEEDED
Status: DISCONTINUED | OUTPATIENT
Start: 2024-03-13 | End: 2024-03-13 | Stop reason: HOSPADM

## 2024-03-13 RX ORDER — ONDANSETRON 2 MG/ML
INJECTION INTRAMUSCULAR; INTRAVENOUS AS NEEDED
Status: DISCONTINUED | OUTPATIENT
Start: 2024-03-13 | End: 2024-03-13 | Stop reason: SURG

## 2024-03-13 RX ORDER — ACETAMINOPHEN 650 MG/1
650 SUPPOSITORY RECTAL EVERY 4 HOURS PRN
Status: DISCONTINUED | OUTPATIENT
Start: 2024-03-13 | End: 2024-03-14 | Stop reason: HOSPADM

## 2024-03-13 RX ADMIN — PHENAZOPYRIDINE 200 MG: 100 TABLET ORAL at 09:03

## 2024-03-13 RX ADMIN — DEXAMETHASONE SODIUM PHOSPHATE 4 MG: 4 INJECTION INTRA-ARTICULAR; INTRALESIONAL; INTRAMUSCULAR; INTRAVENOUS; SOFT TISSUE at 10:56

## 2024-03-13 RX ADMIN — SODIUM CHLORIDE, POTASSIUM CHLORIDE, SODIUM LACTATE AND CALCIUM CHLORIDE 9 ML/HR: 600; 310; 30; 20 INJECTION, SOLUTION INTRAVENOUS at 09:03

## 2024-03-13 RX ADMIN — SODIUM CHLORIDE, POTASSIUM CHLORIDE, SODIUM LACTATE AND CALCIUM CHLORIDE 100 ML/HR: 600; 310; 30; 20 INJECTION, SOLUTION INTRAVENOUS at 18:04

## 2024-03-13 RX ADMIN — ROCURONIUM BROMIDE 20 MG: 10 INJECTION INTRAVENOUS at 11:51

## 2024-03-13 RX ADMIN — SODIUM CHLORIDE 2000 MG: 900 INJECTION INTRAVENOUS at 18:04

## 2024-03-13 RX ADMIN — EPHEDRINE SULFATE 10 MG: 50 INJECTION INTRAVENOUS at 11:20

## 2024-03-13 RX ADMIN — SODIUM CHLORIDE, POTASSIUM CHLORIDE, SODIUM LACTATE AND CALCIUM CHLORIDE: 600; 310; 30; 20 INJECTION, SOLUTION INTRAVENOUS at 12:43

## 2024-03-13 RX ADMIN — PROPOFOL 120 MG: 10 INJECTION, EMULSION INTRAVENOUS at 10:56

## 2024-03-13 RX ADMIN — LIDOCAINE HYDROCHLORIDE 50 MG: 10 INJECTION, SOLUTION EPIDURAL; INFILTRATION; INTRACAUDAL; PERINEURAL at 10:56

## 2024-03-13 RX ADMIN — ONDANSETRON 4 MG: 2 INJECTION INTRAMUSCULAR; INTRAVENOUS at 12:40

## 2024-03-13 RX ADMIN — FENTANYL CITRATE 50 MCG: 50 INJECTION, SOLUTION INTRAMUSCULAR; INTRAVENOUS at 13:45

## 2024-03-13 RX ADMIN — CARVEDILOL 25 MG: 12.5 TABLET, FILM COATED ORAL at 18:04

## 2024-03-13 RX ADMIN — SODIUM CHLORIDE 2000 MG: 900 INJECTION INTRAVENOUS at 10:56

## 2024-03-13 RX ADMIN — FAMOTIDINE 20 MG: 20 TABLET ORAL at 09:03

## 2024-03-13 RX ADMIN — LOSARTAN POTASSIUM 100 MG: 50 TABLET, FILM COATED ORAL at 15:11

## 2024-03-13 RX ADMIN — GLYCOPYRROLATE 0.2 MG: 0.4 INJECTION INTRAMUSCULAR; INTRAVENOUS at 11:26

## 2024-03-13 RX ADMIN — TERAZOSIN HYDROCHLORIDE 1 MG: 1 CAPSULE ORAL at 20:33

## 2024-03-13 RX ADMIN — LIDOCAINE HYDROCHLORIDE 0.5 ML: 10 INJECTION, SOLUTION EPIDURAL; INFILTRATION; INTRACAUDAL; PERINEURAL at 09:03

## 2024-03-13 RX ADMIN — GLYCOPYRROLATE 0.2 MG: 0.4 INJECTION INTRAMUSCULAR; INTRAVENOUS at 11:22

## 2024-03-13 RX ADMIN — OXYCODONE HYDROCHLORIDE AND ACETAMINOPHEN 1 TABLET: 7.5; 325 TABLET ORAL at 20:33

## 2024-03-13 RX ADMIN — SUGAMMADEX 200 MG: 100 INJECTION, SOLUTION INTRAVENOUS at 12:40

## 2024-03-13 RX ADMIN — ROCURONIUM BROMIDE 50 MG: 10 INJECTION INTRAVENOUS at 10:56

## 2024-03-13 RX ADMIN — FENTANYL CITRATE 100 MCG: 50 INJECTION, SOLUTION INTRAMUSCULAR; INTRAVENOUS at 12:40

## 2024-03-13 NOTE — OP NOTE
GYN Operative Note    Patient Name, age and sex:  Maryana Freeman, 68 y.o., female  Procedure Date:  3/13/2024    Surgeons and Role:  Panel 1:     * Zayra Chong MD - Primary  Panel 2:     * Zayra Chong MD - Primary    Additional Staff: Tammy HERNANDEZ  Medically necessary for assistance.Complex retraction. Suturing skills.Complex and critical patient postioning.  Laparoscopic instrument use and manipulation. Robotic surgery.     Pre-op diagnosis: Uterine prolapse cystocele    Postop diagnosis: Same plus extensive pelvic adhesions from likely bowel inflammatory or diverticular disease.    * No Diagnosis Codes entered *    * No Diagnosis Codes entered *    Procedure(s):  ROBOTIC ASSISTED HYSTERECTOMY WITH BILATERAL SALPINGOOPHORECTOMY, LYSIS OF ADHESIONS  CYSTOSCOPY  ANTERIOR REPAIR    Indications for Operation: Patient is a 68-year-old female who had presented with vaginal prolapse and was found to have significant uterine prolapse and cystocele.  The patient was given option for surgical management and at this point she desired to proceed with that.  The response alternatives all discussed with patient patient agrees consents to procedure and the risk thereof.    Antibiotics:  cefazolin (Ancef) ordered on call to OR    Anesthesia:  Type: General -   ASA:  III    Operative Findings: By laparoscopy there was an extensive amount of filmy bowel adhesions in the lower pelvis and around the colon.  The bowel was adherent to the uterus and the ovaries were adherent to the uterus and the bowel bilaterally.  The ovaries otherwise appeared normal.  There were no nodules or masses within these adhesions but just a filmy adhesive that looked kind of like an inflammatory bowel process was going on although no active disease was seen.  On cystoscopy after repair each the bladder was intact and each ureteral orifice was draining Pyridium stained urine.  The uterosacral ligament suspension was not  needed once the uterus was removed the actual uterosacral ligaments were well supported and the cuff was anchored to the uterosacral ligaments that were already there at the cuff level.  The patient did not have a significant rectocele.  She does have some atrophy or lichen sclerosus of the vulva of note.    Specimens Removed:    Specimens       ID Source Type Tests Collected By Collected At Frozen?    A Uterus with Cervix, Bilateral Tubes and Ovaries Tissue TISSUE PATHOLOGY EXAM   Zayra Chong MD 3/13/24 1128 No            Estimated Blood Loss: 100 mL    Urine Output: 250 mL    Complications: None    Procedure Narrative: Patient was taken the operating room where general anesthesia was found be adequate.  She was then prepped and draped normal sterile fashion dorsolithotomy position in the Andres yellowfin stirrups.  Three-way Carty catheter was placed and a [place] uterine manipulator and the uterus.  Attention was then turned the abdomen where sterile gloves were changed.  A supraumbilical incision was then made after injecting local.  The abdominal wall was tented up and the Veress needle was placed.  I confirmed placement with a drop saline test and low patient pressure CO2.  The pneumoperitoneum was then created.  The optical trocar was then used to enter into the abdominal cavity under direct visualization.  Then the robotic ports were placed the first and second robotic port and a 12 mm assist port in the right upper quadrant.  The robot was then docked.  There was extensive lysis of adhesions at least half of the time of the procedure was used to free up the uterus.  Each bilateral tube and ovary was also engulfed and adhesions which were lysed free completely.  Once all the adhesions were lysed free the infundibulopelvic ligament was coagulated and transected across bilaterally.  It was carried to the level the round ligament.  The round ligament was coagulated and transected bilaterally and the  bladder flap was created anteriorly and dissected the bladder well below the level of the Koh ring.  The uterine vessels were then skeletonized bilaterally and coagulated with the [place] bipolar.  These were then lysed free.  The cuff was then entered with the monopolar scissors in a circumferential manner and the cervix was excised from the cuff with the monopolar scissors in a circumferential manner.  The cervix was then removed through the vagina intact with its attached uterus and there was a fibroid and bilateral tubes and ovaries.  Then assist for the any need of suspension I did not need any suspension sutures.  At this point we undocked the robotic instruments and robot was removed.  Legs at 90 degree lithotomy.  The cuff was grasped vaginally and inspected and the apex had really good support so the uterosacral ligaments then actually self's were well supported and attached to the cuff.  Although the cystocele was still present so we proceeded on with the cystocele repair.  Anterior vaginal wall grasped with Allis clamps injected with a local solution along the vaginal wall in the midline of the cystocele.  The Metzenbaums were then used to dissect along the midline and of the cystocele.  The pubocervical fascia was then dissected off bilaterally.  Once the cystocele was completely dissected free the 0 Vicryl was then used to close the pubocervical fascia and elevate the bladder with good support.  2 layers of Vicryl sutures were then used to elevate the cystocele support.  Excess vaginal mucosa was trimmed and this was closed with a running locking 3-0 Vicryl suture to the level of the cuff.  The cuff was then closed with interrupted 0 Vicryl figure-of-eight sutures making sure to incorporate the uterosacral ligament at the cuff angle for closure.  3 cuff sutures of 0 Vicryl suture were used for closure of the cuff.  Then a cystoscopy was performed bladder filled with 300 cc sterile saline.  Bladder  itself was intact no defects or sutures.  Each ureteral orifice was identified and noted to be spilling normal Pyridium stained urine bilaterally.  So there was noted to not be any significant rectocele and genital hiatus size was normal.  The vagina was hemostatic copious irrigation was then performed.  Attention back laparoscopically with sterile gloves Notes hemostasis at the cuff.  It was copiously irrigated again laparoscopically.  All the trocars were then removed the gas removed from the trocars.  Hemostasis had been confirmed under low CO2 pressure.  The skin incisions were closed with subcuticular Monocryl and a iodine dressing with Tegaderm was used for closure so that she had an allergy to skin glue.  She was then cleansed from all prep.  A final count was correct.  She was taken a lithotomy position awoken from general anesthesia and taken to recovery in stable condition.    Zayra Chong MD - 3/13/2024, 13:05 EDT

## 2024-03-13 NOTE — ANESTHESIA PROCEDURE NOTES
Airway  Urgency: elective    Date/Time: 3/13/2024 11:05 AM  Airway not difficult    General Information and Staff    Patient location during procedure: OR  CRNA/CAA: Junior DUSTY Rosenthal, CRNA    Indications and Patient Condition  Indications for airway management: airway protection    Preoxygenated: yes  MILS not maintained throughout  Mask difficulty assessment: 1 - vent by mask    Final Airway Details  Final airway type: endotracheal airway      Successful airway: ETT  Cuffed: yes   Successful intubation technique: direct laryngoscopy  Endotracheal tube insertion site: oral  Blade: Kady  Blade size: 3  ETT size (mm): 7.5  Cormack-Lehane Classification: grade I - full view of glottis  Placement verified by: chest auscultation and capnometry   Measured from: lips  ETT/EBT  to lips (cm): 20  Number of attempts at approach: 1  Assessment: lips, teeth, and gum same as pre-op and atraumatic intubation    Additional Comments  Negative epigastric sounds, Breath sound equal bilaterally with symmetric chest rise and fall

## 2024-03-13 NOTE — PLAN OF CARE
Problem: Adult Inpatient Plan of Care  Goal: Plan of Care Review  Outcome: Ongoing, Progressing  Flowsheets  Taken 3/13/2024 1615 by Brigido Perez, RN  Progress: improving  Outcome Evaluation: VSS. Resting well. Up with standby. No pain meds needed at this time. DTV. Continue care.  Taken 3/13/2024 1402 by aCrla Chicas RN  Plan of Care Reviewed With: patient   Goal Outcome Evaluation:           Progress: improving  Outcome Evaluation: VSS. Resting well. Up with standby. No pain meds needed at this time. DTV. Continue care.

## 2024-03-13 NOTE — CONSULTS
Patient Name: Maryana Freeman  MRN: [id number]  : 1955  DOS: 3/13/2024    Attending: Zayra Chong,*    Primary Care Provider: Astrid Smalls MD      Reason for consultation: Postop medical management    Subjective   Patient is a 68 y.o. female presented for scheduled surgery by Dr. Chong.  She underwent hysterectomy with bilateral salpingo-oophorectomy, cystoscopy and A&P repair under general anesthesia.  She tolerated surgery well and was admitted for further medical management.  She had vaginal prolapse and significant uterine prolapse and cystocele.  She reports history of urinary frequency, urgency, hesitancy, weaker stream and sensation of incomplete emptying.     She has history of hypertension and atrial fibrillation.  She is followed by Dr. Rodgers, cardiology, and had preop cardiac clearance.     When seen in PACU she is doing well.  Her pain is well-controlled.  She denies nausea, shortness of breath or chest pain.  No history of DVT or PE.    Allergies:  Allergies   Allergen Reactions    Wound Dressing Adhesive Dermatitis     blisters       Meds:  Medications Prior to Admission   Medication Sig Dispense Refill Last Dose    carvedilol (COREG) 25 MG tablet Take 1 tablet by mouth 2 (Two) Times a Day With Meals. 180 tablet 3 3/13/2024 at 0715    losartan (COZAAR) 100 MG tablet Take 1 tablet by mouth Daily. 90 tablet 3 3/12/2024 at 0800    probiotic (CULTURELLE) capsule capsule Take 1 capsule by mouth Daily.   3/12/2024 at 0800    terazosin (HYTRIN) 1 MG capsule Take 1 capsule by mouth Every Night. 90 capsule 2 3/12/2024 at 2200    rivaroxaban (XARELTO) 20 MG tablet Take 1 tablet by mouth Daily. 90 tablet 3 3/9/2024 at 1730       History:   Past Medical History:   Diagnosis Date    Abnormal ECG     Atrial fibrillation     Diverticulosis     Female bladder prolapse     PONV (postoperative nausea and vomiting)     Uterine prolapse      Past Surgical History:   Procedure  "Laterality Date    CARDIOVERSION  2014    COLONOSCOPY      x3    HIP SURGERY Bilateral     bilateral hip arthroplasty    KNEE ARTHROPLASTY Right      Family History   Problem Relation Age of Onset    Arrhythmia Mother     Alzheimer's disease Mother     Hyperlipidemia Father     Arrhythmia Sister     No Known Problems Brother     No Known Problems Brother      Social History     Tobacco Use    Smoking status: Never    Smokeless tobacco: Never   Vaping Use    Vaping status: Never Used   Substance Use Topics    Alcohol use: Yes     Alcohol/week: 2.0 - 3.0 standard drinks of alcohol     Types: 2 - 3 Standard drinks or equivalent per week     Comment: occas    Drug use: Never   She lives alone and has 2 children.  She is retired teacher.    Review of Systems  Pertinent items are noted in HPI, all other systems reviewed and negative    Vital Signs  Visit Vitals  /80 (BP Location: Right arm, Patient Position: Lying)   Pulse 72   Temp 97.5 °F (36.4 °C) (Temporal)   Resp 18   Ht 162.6 cm (64\")   Wt 95.6 kg (210 lb 12.2 oz)   SpO2 98%   BMI 36.18 kg/m²       Physical Exam:    General Appearance:    Alert, cooperative, in no acute distress   Head:    Normocephalic, without obvious abnormality, atraumatic   Eyes:            Lids and lashes normal, conjunctivae and sclerae normal, no   icterus, no pallor, corneas clear, PERRLA   Ears:    Ears appear intact with no abnormalities noted   Neck:   No adenopathy, supple, trachea midline, no thyromegaly, no     carotid bruit, no JVD   Lungs:     Clear to auscultation,respirations regular, even and                   unlabored    Heart:    Regular rhythm and normal rate, normal S1 and S2, no            murmur, no gallop, no rub, no click   Abdomen:   Soft, expected tenderness.  Lap sites CDI   :  Carty-orange UOP   Extremities:   Moves all extremities well, no edema, no cyanosis, no              redness   Pulses:   Pulses palpable and equal bilaterally   Skin:   No bleeding, " "bruising or rash   Neurologic:   Cranial nerves 2 - 12 grossly intact, sensation intact      I reviewed the patient's new clinical results.       Results from last 7 days   Lab Units 03/11/24  1341   WBC 10*3/mm3 5.44   HEMOGLOBIN g/dL 12.8   HEMATOCRIT % 37.6   PLATELETS 10*3/mm3 226     Results from last 7 days   Lab Units 03/11/24  1341   SODIUM mmol/L 136   POTASSIUM mmol/L 4.6   CHLORIDE mmol/L 101   CO2 mmol/L 28.0   BUN mg/dL 14   CREATININE mg/dL 0.75   CALCIUM mg/dL 9.5   GLUCOSE mg/dL 93     No results found for: \"HGBA1C\"    Assessment and Plan:     S/P hysterectomy and A&P repair    Uterine prolapse    Atrial fibrillation, chronic    Essential hypertension      Recommendations:     1. PT/OT- early ambulation post op  2. Pain control-prns   3. IS-encourage  [facility]  Resume Xarelto when okay with Dr. Chong (consider Lovenox bridge until Xarelto was resumed?)  5. Bowel regimen  6. Resume home medications per Dr. Chong  7. Monitor post-op labs  8. DC planning for home    HTN, afib  - Continue home Coreg and Cozaar  - Monitor BP   - Holding parameters for BP meds  - Labetalol PRN for SBP>170      CORAL Carrera  03/13/24  14:45 EDT  "

## 2024-03-13 NOTE — ANESTHESIA POSTPROCEDURE EVALUATION
Patient: Maryana Freeman    Procedure Summary       Date: 03/13/24 Room / [facility]:  [facility]    Anesthesia Start: 1047 Anesthesia Stop: 1258    Procedures:       ROBOTIC ASSISTED HYSTERECTOMY WITH BILATERAL SALPINGOOPHORECTOMY, UTEROSACRAL LIGAMENT VAGINAL VAULT SUSPENSION (Abdomen)      CYSTOSCOPY (Bladder)      ANTERIOR AND POSTERIOR REPAIR (Vagina) Diagnosis:     Surgeons: Zayra Chong MD Provider: Melchor Gonzalez MD    Anesthesia Type: general ASA Status: 3            Anesthesia Type: general    Vitals  Vitals Value Taken Time   /85 03/13/24 1255   Temp     Pulse 73 03/13/24 1258   Resp     SpO2 100 % 03/13/24 1258   Vitals shown include unfiled device data.        Post Anesthesia Care and Evaluation    Patient location during evaluation: PACU  Patient participation: complete - patient participated  Level of consciousness: awake and alert  Pain management: adequate    Airway patency: patent  Anesthetic complications: No anesthetic complications  PONV Status: none  Cardiovascular status: hemodynamically stable and acceptable  Respiratory status: nonlabored ventilation, acceptable and nasal cannula  Hydration status: acceptable    Comments: Rr14 97.2

## 2024-03-13 NOTE — ANESTHESIA PREPROCEDURE EVALUATION
Anesthesia Evaluation     history of anesthetic complications:  PONV               Airway   Mallampati: I  TM distance: >3 FB  Neck ROM: full  No difficulty expected  Dental      Pulmonary    Cardiovascular     ECG reviewed    (+) hypertension, valvular problems/murmurs MR and TI, dysrhythmias Atrial Fib    ROS comment: Good ef    Neuro/Psych  GI/Hepatic/Renal/Endo    (+) obesity    Musculoskeletal     Abdominal    Substance History      OB/GYN          Other                    Anesthesia Plan    ASA 3     general     intravenous induction     Anesthetic plan, risks, benefits, and alternatives have been provided, discussed and informed consent has been obtained with: patient.    Plan discussed with CRNA.    CODE STATUS:

## 2024-03-14 VITALS
OXYGEN SATURATION: 98 % | WEIGHT: 210.76 LBS | TEMPERATURE: 97.9 F | SYSTOLIC BLOOD PRESSURE: 144 MMHG | DIASTOLIC BLOOD PRESSURE: 90 MMHG | HEIGHT: 64 IN | BODY MASS INDEX: 35.98 KG/M2 | RESPIRATION RATE: 16 BRPM | HEART RATE: 58 BPM

## 2024-03-14 PROBLEM — Z90.710 S/P HYSTERECTOMY: Status: RESOLVED | Noted: 2024-03-13 | Resolved: 2024-03-14

## 2024-03-14 PROBLEM — Z90.710 S/P HYSTERECTOMY: Status: ACTIVE | Noted: 2024-03-14

## 2024-03-14 PROBLEM — Z79.01 CHRONIC ANTICOAGULATION: Status: ACTIVE | Noted: 2024-03-14

## 2024-03-14 LAB
ANION GAP SERPL CALCULATED.3IONS-SCNC: 10 MMOL/L (ref 5–15)
BUN SERPL-MCNC: 14 MG/DL (ref 8–23)
BUN/CREAT SERPL: 18.2 (ref 7–25)
CALCIUM SPEC-SCNC: 9 MG/DL (ref 8.6–10.5)
CHLORIDE SERPL-SCNC: 102 MMOL/L (ref 98–107)
CO2 SERPL-SCNC: 24 MMOL/L (ref 22–29)
CREAT SERPL-MCNC: 0.77 MG/DL (ref 0.57–1)
CYTO UR: NORMAL
DEPRECATED RDW RBC AUTO: 42.5 FL (ref 37–54)
EGFRCR SERPLBLD CKD-EPI 2021: 83.6 ML/MIN/1.73
ERYTHROCYTE [DISTWIDTH] IN BLOOD BY AUTOMATED COUNT: 13.2 % (ref 12.3–15.4)
GLUCOSE SERPL-MCNC: 110 MG/DL (ref 65–99)
HCT VFR BLD AUTO: 35 % (ref 34–46.6)
HGB BLD-MCNC: 11.6 G/DL (ref 12–15.9)
LAB AP CASE REPORT: NORMAL
LAB AP CLINICAL INFORMATION: NORMAL
MCH RBC QN AUTO: 29.1 PG (ref 26.6–33)
MCHC RBC AUTO-ENTMCNC: 33.1 G/DL (ref 31.5–35.7)
MCV RBC AUTO: 87.9 FL (ref 79–97)
PATH REPORT.FINAL DX SPEC: NORMAL
PATH REPORT.GROSS SPEC: NORMAL
PLATELET # BLD AUTO: 199 10*3/MM3 (ref 140–450)
PMV BLD AUTO: 9.8 FL (ref 6–12)
POTASSIUM SERPL-SCNC: 4.2 MMOL/L (ref 3.5–5.2)
RBC # BLD AUTO: 3.98 10*6/MM3 (ref 3.77–5.28)
SODIUM SERPL-SCNC: 136 MMOL/L (ref 136–145)
WBC NRBC COR # BLD AUTO: 6.98 10*3/MM3 (ref 3.4–10.8)

## 2024-03-14 PROCEDURE — 80048 BASIC METABOLIC PNL TOTAL CA: CPT | Performed by: OBSTETRICS & GYNECOLOGY

## 2024-03-14 PROCEDURE — A9270 NON-COVERED ITEM OR SERVICE: HCPCS | Performed by: OBSTETRICS & GYNECOLOGY

## 2024-03-14 PROCEDURE — 25010000002 HEPARIN (PORCINE) PER 1000 UNITS: Performed by: OBSTETRICS & GYNECOLOGY

## 2024-03-14 PROCEDURE — 63710000001 CARVEDILOL 12.5 MG TABLET: Performed by: OBSTETRICS & GYNECOLOGY

## 2024-03-14 PROCEDURE — 85027 COMPLETE CBC AUTOMATED: CPT | Performed by: OBSTETRICS & GYNECOLOGY

## 2024-03-14 PROCEDURE — 25010000002 CEFAZOLIN PER 500 MG: Performed by: OBSTETRICS & GYNECOLOGY

## 2024-03-14 PROCEDURE — 63710000001 LOSARTAN 50 MG TABLET: Performed by: OBSTETRICS & GYNECOLOGY

## 2024-03-14 RX ORDER — ENOXAPARIN SODIUM 100 MG/ML
1 INJECTION SUBCUTANEOUS EVERY 12 HOURS SCHEDULED
Qty: 14 ML | Refills: 0 | Status: SHIPPED | OUTPATIENT
Start: 2024-03-14 | End: 2024-03-23 | Stop reason: HOSPADM

## 2024-03-14 RX ADMIN — SODIUM CHLORIDE 2000 MG: 900 INJECTION INTRAVENOUS at 02:36

## 2024-03-14 RX ADMIN — LOSARTAN POTASSIUM 100 MG: 50 TABLET, FILM COATED ORAL at 08:09

## 2024-03-14 RX ADMIN — HEPARIN SODIUM 5000 UNITS: 5000 INJECTION INTRAVENOUS; SUBCUTANEOUS at 05:09

## 2024-03-14 RX ADMIN — CARVEDILOL 25 MG: 12.5 TABLET, FILM COATED ORAL at 08:09

## 2024-03-14 NOTE — PLAN OF CARE
Goal Outcome Evaluation:                        VSS on room air. Sites CDI. Tolerating PO. Adequate UOP. Vaginal bleeding present-- scant serosang appearance. Pt ambulating with standby assistance.

## 2024-03-14 NOTE — CASE MANAGEMENT/SOCIAL WORK
Continued Stay Note  [facility]     Patient Name: Maryana Freeman  MRN: [id number]  Today's Date: 3/14/2024    Admit Date: 3/13/2024        Discharge Plan       Row Name 03/14/24 1134       Plan    Final Discharge Disposition Code 01 - home or self-care                   Discharge Codes    No documentation.                 Expected Discharge Date and Time       Expected Discharge Date Expected Discharge Time    Mar 14, 2024               AVERY Albarran

## 2024-03-14 NOTE — DISCHARGE SUMMARY
Patient Name: Maryana Freeman  MRN: [id number]  : 1955  DOS: 3/14/2024    Attending: Zayra Chong,*    Primary Care Provider: Astrid Smalls MD    Date of Admission:.3/13/2024  8:25 AM    Date of Discharge:  3/14/2024    Discharge Diagnosis:   S/P  robotic assisted hysterectomy with bilateral salpingo-oophorectomy, lysis of adhesions, and anterior repair, cystoscopy    Atrial fibrillation, chronic    Essential hypertension    Uterine prolapse    Chronic anticoagulation      Hospital Course  At admit    Patient is a 68 y.o. female presented for scheduled surgery by Dr. Chong.  She underwent hysterectomy with bilateral salpingo-oophorectomy, cystoscopy and A&P repair under general anesthesia.  She tolerated surgery well and was admitted for further medical management.  She had vaginal prolapse and significant uterine prolapse and cystocele.  She reports history of urinary frequency, urgency, hesitancy, weaker stream and sensation of incomplete emptying.      She has history of hypertension and atrial fibrillation.  She is followed by Dr. Rodgers, cardiology, and had preop cardiac clearance.     When seen in PACU she is doing well.  Her pain is well-controlled.  She denies nausea, shortness of breath or chest pain.  No history of DVT or PE.    After admit    She was provided pain medication as needed for pain control.  Pt received DVT prophylaxis with subcutaneous heparin as well as mechanicals      She was started on p.o. diet which she tolerated well prior to discharge.    She was able to void spontaneously    Home medications were resumed as appropriate, and labs were monitored and remained fairly stable.      With the progress she has made, pt is ready for DC home today.      We discussed resuming anticoagulation with therapeutic Lovenox to start in the evening on a twice daily dosing for 7 days before resuming Xarelto.  Patient has been instructed what to watch for in terms  "of bleeding.    Discussed with patient regarding plan and she shows understanding and agreement.       Procedures Performed  Procedure(s):  ROBOTIC ASSISTED HYSTERECTOMY WITH BILATERAL SALPINGOOPHORECTOMY, UTEROSACRAL LIGAMENT VAGINAL VAULT SUSPENSION  CYSTOSCOPY  ANTERIOR AND POSTERIOR REPAIR       Pertinent Test Results:    I reviewed the patient's new clinical results.   Results from last 7 days   Lab Units 24  0407 24  1341   WBC 10*3/mm3 6.98 5.44   HEMOGLOBIN g/dL 11.6* 12.8   HEMATOCRIT % 35.0 37.6   PLATELETS 10*3/mm3 199 226     Results from last 7 days   Lab Units 24  0407 24  1341   SODIUM mmol/L 136 136   POTASSIUM mmol/L 4.2 4.6   CHLORIDE mmol/L 102 101   CO2 mmol/L 24.0 28.0   BUN mg/dL 14 14   CREATININE mg/dL 0.77 0.75   CALCIUM mg/dL 9.0 9.5   GLUCOSE mg/dL 110* 93     I reviewed the patient's new imaging including images and reports.       Discharge Assessment:       Visit Vitals  /90 (BP Location: Left arm, Patient Position: Lying)   Pulse 58   Temp 97.9 °F (36.6 °C) (Temporal)   Resp 16   Ht 162.6 cm (64\")   Wt 95.6 kg (210 lb 12.2 oz)   SpO2 98%   BMI 36.18 kg/m²     Temp (24hrs), Av.5 °F (36.4 °C), Min:97.1 °F (36.2 °C), Max:98 °F (36.7 °C)      General Appearance:    Alert, cooperative, in no acute distress   Lungs:     Clear to auscultation,respirations regular, even and                   unlabored    Heart:    Irregular rhythm and normal rate, normal S1 and S2    Abdomen:   Soft and benign with clean incisions   Extremities:   Moves all extremities well, no edema, no cyanosis, no              redness   Pulses:   Pulses palpable and equal bilaterally   Skin:   No bleeding, bruising or rash            Discharge Disposition: Home          Discharge Medications        New Medications        Instructions Start Date   Enoxaparin Sodium 100 MG/ML solution prefilled syringe syringe  Commonly known as: LOVENOX   1 mg/kg (100 mg), Subcutaneous, Every 12 Hours " Scheduled, First dose, injection tonight. Twice daily . For 14 doses, then resume Xarelto provided there is not bleed issues.             Changes to Medications        Instructions Start Date   rivaroxaban 20 MG tablet  Commonly known as: XARELTO  What changed: These instructions start on March 20, 2024. If you are unsure what to do until then, ask your doctor or other care provider.   20 mg, Oral, Daily   Start Date: March 20, 2024            Continue These Medications        Instructions Start Date   carvedilol 25 MG tablet  Commonly known as: COREG   25 mg, Oral, 2 Times Daily With Meals      losartan 100 MG tablet  Commonly known as: COZAAR   100 mg, Oral, Daily      probiotic capsule capsule   1 capsule, Oral, Daily      terazosin 1 MG capsule  Commonly known as: HYTRIN   1 mg, Oral, Nightly               Discharge Diet: Regular.  Advised to go slow for the next several days    Activity at Discharge: Ambulate.  Restrictions per Dr. Chong .    Follow-up Appointments:  Future Appointments   Date Time Provider Department Center   4/24/2024  4:00 PM Brenda Rodgers MD [facility]      Zayra Chong MD per her orders.     Dragon disclaimer:  Part of this encounter note is an electronic transcription/translation of spoken language to printed text. The electronic translation of spoken language may permit erroneous, or at times, nonsensical words or phrases to be inadvertently transcribed; Although I have reviewed the note for such errors, some may still exist.       Hayder Perez MD  03/14/24  12:02 EDT

## 2024-03-14 NOTE — PROGRESS NOTES
Surgery Post-op Note  [place]    * No Diagnosis Codes entered *    * No Diagnosis Codes entered *    Procedure(s):  ROBOTIC ASSISTED HYSTERECTOMY WITH BILATERAL SALPINGOOPHORECTOMY, UTEROSACRAL LIGAMENT VAGINAL VAULT SUSPENSION  CYSTOSCOPY  ANTERIOR AND POSTERIOR REPAIR    Subjective     Little bleeding,  is less today.  Did not sleep much due to interuprtion but is otherwise okay.   No cp no sob, pain controlled.  No n/v.  Tolerating regualar diet.     Objective   Physical Exam  Vitals:    03/14/24 0305   BP: 130/64   Pulse: 85   Resp: 18   Temp: 97.1 °F (36.2 °C)   SpO2: 97%     General: awake, alert, comfortable    Pulm: CTAB    CVS: no M/R/G, reg rate    Abd: soft, NT, ND, NABS    Ext: warm, well perfused      Labs:  Lab Results (last 24 hours)       Procedure Component Value Units Date/Time    Basic Metabolic Panel [[id number]]  (Abnormal) Collected: 03/14/24 0407    Specimen: Blood Updated: 03/14/24 0511     Glucose 110 mg/dL      BUN 14 mg/dL      Creatinine 0.77 mg/dL      Sodium 136 mmol/L      Potassium 4.2 mmol/L      Chloride 102 mmol/L      CO2 24.0 mmol/L      Calcium 9.0 mg/dL      BUN/Creatinine Ratio 18.2     Anion Gap 10.0 mmol/L      eGFR 83.6 mL/min/1.73     Narrative:      GFR Normal >60  Chronic Kidney Disease <60  Kidney Failure <15      CBC (No Diff) [[id number]]  (Abnormal) Collected: 03/14/24 0407    Specimen: Blood Updated: 03/14/24 0438     WBC 6.98 10*3/mm3      RBC 3.98 10*6/mm3      Hemoglobin 11.6 g/dL      Hematocrit 35.0 %      MCV 87.9 fL      MCH 29.1 pg      MCHC 33.1 g/dL      RDW 13.2 %      RDW-SD 42.5 fl      MPV 9.8 fL      Platelets 199 10*3/mm3     Tissue Pathology Exam [[id number]] Collected: 03/13/24 1128    Specimen: Tissue from Uterus with Cervix, Bilateral Tubes and Ovaries Updated: 03/13/24 1238                Intake and Output:    Intake/Output Summary (Last 24 hours) at 3/14/2024 0657  Last data filed at 3/14/2024 0454  Gross per 24 hour   Intake 1250 ml   Output 1270  ml   Net -25 ml       Assessment     The patient is a 68 y.o. female 1 Day Post-Op after [facility] MARIA EUGENIA BSO Anterior repair    Plan     D/c home if cleared by medicine today  A fib.  Will d/c with  lovenox l, risk of bleeding discussed  Postop meds given.  Has follow up.  Labs pending should be back by d/c today.     Zayra Chong MD  03/14/24  06:57 EDT

## 2024-03-19 RX ORDER — CARVEDILOL 25 MG/1
25 TABLET ORAL 2 TIMES DAILY WITH MEALS
Qty: 180 TABLET | Refills: 3 | Status: SHIPPED | OUTPATIENT
Start: 2024-03-19

## 2024-03-19 RX ORDER — LOSARTAN POTASSIUM 100 MG/1
100 TABLET ORAL DAILY
Qty: 90 TABLET | Refills: 3 | Status: SHIPPED | OUTPATIENT
Start: 2024-03-19

## 2024-03-20 ENCOUNTER — APPOINTMENT (OUTPATIENT)
Dept: CT IMAGING | Facility: HOSPITAL | Age: 69
DRG: 908 | End: 2024-03-20
Payer: MEDICARE

## 2024-03-20 ENCOUNTER — HOSPITAL ENCOUNTER (INPATIENT)
Facility: HOSPITAL | Age: 69
LOS: 3 days | Discharge: HOME | DRG: 908 | End: 2024-03-23
Attending: EMERGENCY MEDICINE | Admitting: OBSTETRICS & GYNECOLOGY
Payer: MEDICARE

## 2024-03-20 ENCOUNTER — ANESTHESIA EVENT CONVERTED (OUTPATIENT)
Dept: ANESTHESIOLOGY | Facility: HOSPITAL | Age: 69
DRG: 908 | End: 2024-03-20
Payer: MEDICARE

## 2024-03-20 ENCOUNTER — ANESTHESIA (OUTPATIENT)
Dept: PERIOP | Facility: HOSPITAL | Age: 69
End: 2024-03-20
Payer: MEDICARE

## 2024-03-20 ENCOUNTER — APPOINTMENT (OUTPATIENT)
Dept: GENERAL RADIOLOGY | Facility: HOSPITAL | Age: 69
DRG: 908 | End: 2024-03-20
Payer: MEDICARE

## 2024-03-20 ENCOUNTER — ANESTHESIA EVENT (OUTPATIENT)
Dept: PERIOP | Facility: HOSPITAL | Age: 69
End: 2024-03-20
Payer: MEDICARE

## 2024-03-20 DIAGNOSIS — Z02.89 REFERRED BY HEALTH CARE PROFESSIONAL: ICD-10-CM

## 2024-03-20 DIAGNOSIS — R18.8 FREE FLUID IN PELVIS: ICD-10-CM

## 2024-03-20 DIAGNOSIS — R10.9 ACUTE ABDOMINAL PAIN: ICD-10-CM

## 2024-03-20 DIAGNOSIS — D64.9 ANEMIA, UNSPECIFIED TYPE: ICD-10-CM

## 2024-03-20 DIAGNOSIS — G89.18 POST-OP PAIN: ICD-10-CM

## 2024-03-20 DIAGNOSIS — Z90.710 S/P HYSTERECTOMY: ICD-10-CM

## 2024-03-20 DIAGNOSIS — D62 POSTOPERATIVE ANEMIA DUE TO ACUTE BLOOD LOSS: Primary | ICD-10-CM

## 2024-03-20 PROBLEM — N99.820 POSTOPERATIVE VAGINAL BLEEDING FOLLOWING GENITOURINARY PROCEDURE: Status: ACTIVE | Noted: 2024-03-20

## 2024-03-20 LAB
ABO GROUP BLD: NORMAL
ABO GROUP BLD: NORMAL
ALBUMIN SERPL-MCNC: 3.7 G/DL (ref 3.5–5.2)
ALBUMIN/GLOB SERPL: 1.5 G/DL
ALP SERPL-CCNC: 51 U/L (ref 39–117)
ALT SERPL W P-5'-P-CCNC: 28 U/L (ref 1–33)
ANION GAP SERPL CALCULATED.3IONS-SCNC: 11 MMOL/L (ref 5–15)
ANION GAP SERPL CALCULATED.3IONS-SCNC: 13 MMOL/L (ref 5–15)
AST SERPL-CCNC: 26 U/L (ref 1–32)
BASOPHILS # BLD AUTO: 0.03 10*3/MM3 (ref 0–0.2)
BASOPHILS NFR BLD AUTO: 0.3 % (ref 0–1.5)
BILIRUB SERPL-MCNC: 0.7 MG/DL (ref 0–1.2)
BLD GP AB SCN SERPL QL: NEGATIVE
BUN SERPL-MCNC: 14 MG/DL (ref 8–23)
BUN SERPL-MCNC: 15 MG/DL (ref 8–23)
BUN/CREAT SERPL: 11.7 (ref 7–25)
BUN/CREAT SERPL: 15.2 (ref 7–25)
CALCIUM SPEC-SCNC: 8.1 MG/DL (ref 8.6–10.5)
CALCIUM SPEC-SCNC: 8.8 MG/DL (ref 8.6–10.5)
CHLORIDE SERPL-SCNC: 102 MMOL/L (ref 98–107)
CHLORIDE SERPL-SCNC: 97 MMOL/L (ref 98–107)
CO2 SERPL-SCNC: 18 MMOL/L (ref 22–29)
CO2 SERPL-SCNC: 23 MMOL/L (ref 22–29)
CREAT SERPL-MCNC: 0.99 MG/DL (ref 0.57–1)
CREAT SERPL-MCNC: 1.2 MG/DL (ref 0.57–1)
D-LACTATE SERPL-SCNC: 1.5 MMOL/L (ref 0.5–2)
DEPRECATED RDW RBC AUTO: 45.5 FL (ref 37–54)
DEPRECATED RDW RBC AUTO: 46.8 FL (ref 37–54)
DEVELOPER EXPIRATION DATE: ABNORMAL
DEVELOPER LOT NUMBER: ABNORMAL
EGFRCR SERPLBLD CKD-EPI 2021: 49.1 ML/MIN/1.73
EGFRCR SERPLBLD CKD-EPI 2021: 61.9 ML/MIN/1.73
EOSINOPHIL # BLD AUTO: 0.22 10*3/MM3 (ref 0–0.4)
EOSINOPHIL NFR BLD AUTO: 2 % (ref 0.3–6.2)
ERYTHROCYTE [DISTWIDTH] IN BLOOD BY AUTOMATED COUNT: 13.9 % (ref 12.3–15.4)
ERYTHROCYTE [DISTWIDTH] IN BLOOD BY AUTOMATED COUNT: 15.3 % (ref 12.3–15.4)
EXPIRATION DATE: ABNORMAL
FECAL OCCULT BLOOD SCREEN, POC: POSITIVE
GLOBULIN UR ELPH-MCNC: 2.5 GM/DL
GLUCOSE SERPL-MCNC: 117 MG/DL (ref 65–99)
GLUCOSE SERPL-MCNC: 151 MG/DL (ref 65–99)
HCT VFR BLD AUTO: 22.8 % (ref 34–46.6)
HCT VFR BLD AUTO: 24.4 % (ref 34–46.6)
HCT VFR BLD AUTO: 25.6 % (ref 34–46.6)
HGB BLD-MCNC: 7.5 G/DL (ref 12–15.9)
HGB BLD-MCNC: 8 G/DL (ref 12–15.9)
HGB BLD-MCNC: 8.6 G/DL (ref 12–15.9)
IMM GRANULOCYTES # BLD AUTO: 0.09 10*3/MM3 (ref 0–0.05)
IMM GRANULOCYTES NFR BLD AUTO: 0.8 % (ref 0–0.5)
LIPASE SERPL-CCNC: 28 U/L (ref 13–60)
LYMPHOCYTES # BLD AUTO: 1.1 10*3/MM3 (ref 0.7–3.1)
LYMPHOCYTES NFR BLD AUTO: 9.9 % (ref 19.6–45.3)
Lab: ABNORMAL
MCH RBC QN AUTO: 28.4 PG (ref 26.6–33)
MCH RBC QN AUTO: 29.8 PG (ref 26.6–33)
MCHC RBC AUTO-ENTMCNC: 32.9 G/DL (ref 31.5–35.7)
MCHC RBC AUTO-ENTMCNC: 33.6 G/DL (ref 31.5–35.7)
MCV RBC AUTO: 84.5 FL (ref 79–97)
MCV RBC AUTO: 90.5 FL (ref 79–97)
MONOCYTES # BLD AUTO: 0.61 10*3/MM3 (ref 0.1–0.9)
MONOCYTES NFR BLD AUTO: 5.5 % (ref 5–12)
NEGATIVE CONTROL: NEGATIVE
NEUTROPHILS NFR BLD AUTO: 81.5 % (ref 42.7–76)
NEUTROPHILS NFR BLD AUTO: 9.08 10*3/MM3 (ref 1.7–7)
NRBC BLD AUTO-RTO: 0 /100 WBC (ref 0–0.2)
NT-PROBNP SERPL-MCNC: 558.7 PG/ML (ref 0–900)
PLATELET # BLD AUTO: 153 10*3/MM3 (ref 140–450)
PLATELET # BLD AUTO: 264 10*3/MM3 (ref 140–450)
PMV BLD AUTO: 10.3 FL (ref 6–12)
PMV BLD AUTO: 9.8 FL (ref 6–12)
POSITIVE CONTROL: POSITIVE
POTASSIUM SERPL-SCNC: 4.2 MMOL/L (ref 3.5–5.2)
POTASSIUM SERPL-SCNC: 4.3 MMOL/L (ref 3.5–5.2)
PROT SERPL-MCNC: 6.2 G/DL (ref 6–8.5)
RBC # BLD AUTO: 2.52 10*6/MM3 (ref 3.77–5.28)
RBC # BLD AUTO: 3.03 10*6/MM3 (ref 3.77–5.28)
RH BLD: NEGATIVE
RH BLD: NEGATIVE
SODIUM SERPL-SCNC: 131 MMOL/L (ref 136–145)
SODIUM SERPL-SCNC: 133 MMOL/L (ref 136–145)
T&S EXPIRATION DATE: NORMAL
TROPONIN T SERPL HS-MCNC: 17 NG/L
WBC NRBC COR # BLD AUTO: 11.13 10*3/MM3 (ref 3.4–10.8)
WBC NRBC COR # BLD AUTO: 6.45 10*3/MM3 (ref 3.4–10.8)

## 2024-03-20 PROCEDURE — 83690 ASSAY OF LIPASE: CPT | Performed by: NURSE PRACTITIONER

## 2024-03-20 PROCEDURE — 83880 ASSAY OF NATRIURETIC PEPTIDE: CPT | Performed by: NURSE PRACTITIONER

## 2024-03-20 PROCEDURE — 0U9GXZZ DRAINAGE OF VAGINA, EXTERNAL APPROACH: ICD-10-PCS | Performed by: OBSTETRICS & GYNECOLOGY

## 2024-03-20 PROCEDURE — 93005 ELECTROCARDIOGRAM TRACING: CPT | Performed by: NURSE PRACTITIONER

## 2024-03-20 PROCEDURE — 25810000003 LACTATED RINGERS PER 1000 ML: Performed by: OBSTETRICS & GYNECOLOGY

## 2024-03-20 PROCEDURE — 86923 COMPATIBILITY TEST ELECTRIC: CPT

## 2024-03-20 PROCEDURE — 86900 BLOOD TYPING SEROLOGIC ABO: CPT | Performed by: NURSE PRACTITIONER

## 2024-03-20 PROCEDURE — 25010000002 CEFAZOLIN PER 500 MG: Performed by: PHYSICIAN ASSISTANT

## 2024-03-20 PROCEDURE — 25010000002 DEXAMETHASONE PER 1 MG: Performed by: ANESTHESIOLOGY

## 2024-03-20 PROCEDURE — 25010000002 PROPOFOL 10 MG/ML EMULSION: Performed by: ANESTHESIOLOGY

## 2024-03-20 PROCEDURE — 99291 CRITICAL CARE FIRST HOUR: CPT

## 2024-03-20 PROCEDURE — 0W9G4ZZ DRAINAGE OF PERITONEAL CAVITY, PERCUTANEOUS ENDOSCOPIC APPROACH: ICD-10-PCS | Performed by: OBSTETRICS & GYNECOLOGY

## 2024-03-20 PROCEDURE — 25010000002 SUGAMMADEX 200 MG/2ML SOLUTION: Performed by: ANESTHESIOLOGY

## 2024-03-20 PROCEDURE — 85018 HEMOGLOBIN: CPT | Performed by: OBSTETRICS & GYNECOLOGY

## 2024-03-20 PROCEDURE — 86901 BLOOD TYPING SEROLOGIC RH(D): CPT | Performed by: NURSE PRACTITIONER

## 2024-03-20 PROCEDURE — P9041 ALBUMIN (HUMAN),5%, 50ML: HCPCS | Performed by: ANESTHESIOLOGY

## 2024-03-20 PROCEDURE — 25010000002 BUPIVACAINE 0.5 % SOLUTION: Performed by: OBSTETRICS & GYNECOLOGY

## 2024-03-20 PROCEDURE — 86900 BLOOD TYPING SEROLOGIC ABO: CPT

## 2024-03-20 PROCEDURE — 71045 X-RAY EXAM CHEST 1 VIEW: CPT

## 2024-03-20 PROCEDURE — 25810000003 LACTATED RINGERS PER 1000 ML: Performed by: ANESTHESIOLOGY

## 2024-03-20 PROCEDURE — 25010000002 ONDANSETRON PER 1 MG: Performed by: NURSE PRACTITIONER

## 2024-03-20 PROCEDURE — 80053 COMPREHEN METABOLIC PANEL: CPT | Performed by: NURSE PRACTITIONER

## 2024-03-20 PROCEDURE — 25510000001 IOPAMIDOL 61 % SOLUTION: Performed by: EMERGENCY MEDICINE

## 2024-03-20 PROCEDURE — 25010000002 ALBUMIN HUMAN 5% PER 50 ML: Performed by: ANESTHESIOLOGY

## 2024-03-20 PROCEDURE — 86901 BLOOD TYPING SEROLOGIC RH(D): CPT

## 2024-03-20 PROCEDURE — 85025 COMPLETE CBC W/AUTO DIFF WBC: CPT | Performed by: NURSE PRACTITIONER

## 2024-03-20 PROCEDURE — 74177 CT ABD & PELVIS W/CONTRAST: CPT

## 2024-03-20 PROCEDURE — 83605 ASSAY OF LACTIC ACID: CPT | Performed by: NURSE PRACTITIONER

## 2024-03-20 PROCEDURE — 85027 COMPLETE CBC AUTOMATED: CPT | Performed by: OBSTETRICS & GYNECOLOGY

## 2024-03-20 PROCEDURE — 86850 RBC ANTIBODY SCREEN: CPT | Performed by: NURSE PRACTITIONER

## 2024-03-20 PROCEDURE — 25010000002 FENTANYL CITRATE (PF) 50 MCG/ML SOLUTION

## 2024-03-20 PROCEDURE — 25010000002 HYDROMORPHONE PER 4 MG: Performed by: EMERGENCY MEDICINE

## 2024-03-20 PROCEDURE — 85014 HEMATOCRIT: CPT | Performed by: OBSTETRICS & GYNECOLOGY

## 2024-03-20 PROCEDURE — 25010000002 FENTANYL CITRATE (PF) 100 MCG/2ML SOLUTION: Performed by: ANESTHESIOLOGY

## 2024-03-20 PROCEDURE — 36430 TRANSFUSION BLD/BLD COMPNT: CPT

## 2024-03-20 PROCEDURE — 25010000002 ONDANSETRON PER 1 MG: Performed by: ANESTHESIOLOGY

## 2024-03-20 PROCEDURE — P9016 RBC LEUKOCYTES REDUCED: HCPCS

## 2024-03-20 PROCEDURE — 25010000002 FUROSEMIDE PER 20 MG: Performed by: PHYSICIAN ASSISTANT

## 2024-03-20 PROCEDURE — 25810000003 SODIUM CHLORIDE 0.9 % SOLUTION: Performed by: NURSE PRACTITIONER

## 2024-03-20 PROCEDURE — 0TJB8ZZ INSPECTION OF BLADDER, VIA NATURAL OR ARTIFICIAL OPENING ENDOSCOPIC: ICD-10-PCS | Performed by: OBSTETRICS & GYNECOLOGY

## 2024-03-20 PROCEDURE — 25010000002 PROTAMINE SULFATE PER 10 MG: Performed by: INTERNAL MEDICINE

## 2024-03-20 PROCEDURE — 25810000003 SODIUM CHLORIDE PER 500 ML: Performed by: OBSTETRICS & GYNECOLOGY

## 2024-03-20 PROCEDURE — 84484 ASSAY OF TROPONIN QUANT: CPT | Performed by: NURSE PRACTITIONER

## 2024-03-20 PROCEDURE — 82270 OCCULT BLOOD FECES: CPT | Performed by: NURSE PRACTITIONER

## 2024-03-20 DEVICE — SEAL HEMO SURG ARISTA/AH ABS/PWDR 3GM: Type: IMPLANTABLE DEVICE | Site: ABDOMEN | Status: FUNCTIONAL

## 2024-03-20 DEVICE — SEAL HEMO SURG ARISTA/AH ABS/PWDR 1GM: Type: IMPLANTABLE DEVICE | Site: ABDOMEN | Status: FUNCTIONAL

## 2024-03-20 RX ORDER — DEXAMETHASONE SODIUM PHOSPHATE 4 MG/ML
INJECTION, SOLUTION INTRA-ARTICULAR; INTRALESIONAL; INTRAMUSCULAR; INTRAVENOUS; SOFT TISSUE AS NEEDED
Status: DISCONTINUED | OUTPATIENT
Start: 2024-03-20 | End: 2024-03-20 | Stop reason: SURG

## 2024-03-20 RX ORDER — MAGNESIUM HYDROXIDE 1200 MG/15ML
LIQUID ORAL AS NEEDED
Status: DISCONTINUED | OUTPATIENT
Start: 2024-03-20 | End: 2024-03-20 | Stop reason: HOSPADM

## 2024-03-20 RX ORDER — FENTANYL CITRATE 50 UG/ML
50 INJECTION, SOLUTION INTRAMUSCULAR; INTRAVENOUS
Status: DISCONTINUED | OUTPATIENT
Start: 2024-03-20 | End: 2024-03-20 | Stop reason: HOSPADM

## 2024-03-20 RX ORDER — SODIUM CHLORIDE 9 MG/ML
40 INJECTION, SOLUTION INTRAVENOUS AS NEEDED
Status: DISCONTINUED | OUTPATIENT
Start: 2024-03-20 | End: 2024-03-20 | Stop reason: SDUPTHER

## 2024-03-20 RX ORDER — SODIUM CHLORIDE 0.9 % (FLUSH) 0.9 %
10 SYRINGE (ML) INJECTION AS NEEDED
Status: DISCONTINUED | OUTPATIENT
Start: 2024-03-20 | End: 2024-03-20 | Stop reason: SDUPTHER

## 2024-03-20 RX ORDER — SIMETHICONE 80 MG
80 TABLET,CHEWABLE ORAL 4 TIMES DAILY PRN
Status: DISCONTINUED | OUTPATIENT
Start: 2024-03-20 | End: 2024-03-23 | Stop reason: HOSPADM

## 2024-03-20 RX ORDER — HYDROMORPHONE HYDROCHLORIDE 1 MG/ML
0.25 INJECTION, SOLUTION INTRAMUSCULAR; INTRAVENOUS; SUBCUTANEOUS ONCE
Status: COMPLETED | OUTPATIENT
Start: 2024-03-20 | End: 2024-03-20

## 2024-03-20 RX ORDER — ALBUMIN, HUMAN INJ 5% 5 %
SOLUTION INTRAVENOUS CONTINUOUS PRN
Status: DISCONTINUED | OUTPATIENT
Start: 2024-03-20 | End: 2024-03-20 | Stop reason: SURG

## 2024-03-20 RX ORDER — SODIUM CHLORIDE 0.9 % (FLUSH) 0.9 %
10 SYRINGE (ML) INJECTION EVERY 12 HOURS SCHEDULED
Status: DISCONTINUED | OUTPATIENT
Start: 2024-03-20 | End: 2024-03-20 | Stop reason: HOSPADM

## 2024-03-20 RX ORDER — ONDANSETRON 2 MG/ML
INJECTION INTRAMUSCULAR; INTRAVENOUS AS NEEDED
Status: DISCONTINUED | OUTPATIENT
Start: 2024-03-20 | End: 2024-03-20 | Stop reason: SURG

## 2024-03-20 RX ORDER — SODIUM CHLORIDE, SODIUM LACTATE, POTASSIUM CHLORIDE, CALCIUM CHLORIDE 600; 310; 30; 20 MG/100ML; MG/100ML; MG/100ML; MG/100ML
100 INJECTION, SOLUTION INTRAVENOUS CONTINUOUS
Status: DISCONTINUED | OUTPATIENT
Start: 2024-03-20 | End: 2024-03-22

## 2024-03-20 RX ORDER — OXYCODONE AND ACETAMINOPHEN 7.5; 325 MG/1; MG/1
1 TABLET ORAL EVERY 4 HOURS PRN
Status: DISCONTINUED | OUTPATIENT
Start: 2024-03-20 | End: 2024-03-23 | Stop reason: HOSPADM

## 2024-03-20 RX ORDER — NALOXONE HCL 0.4 MG/ML
0.4 VIAL (ML) INJECTION
Status: DISCONTINUED | OUTPATIENT
Start: 2024-03-20 | End: 2024-03-23 | Stop reason: HOSPADM

## 2024-03-20 RX ORDER — TEMAZEPAM 7.5 MG/1
7.5 CAPSULE ORAL NIGHTLY PRN
Status: DISCONTINUED | OUTPATIENT
Start: 2024-03-20 | End: 2024-03-23 | Stop reason: HOSPADM

## 2024-03-20 RX ORDER — MIDAZOLAM HYDROCHLORIDE 1 MG/ML
0.5 INJECTION INTRAMUSCULAR; INTRAVENOUS
Status: DISCONTINUED | OUTPATIENT
Start: 2024-03-20 | End: 2024-03-20 | Stop reason: HOSPADM

## 2024-03-20 RX ORDER — LIDOCAINE HYDROCHLORIDE 10 MG/ML
INJECTION, SOLUTION EPIDURAL; INFILTRATION; INTRACAUDAL; PERINEURAL AS NEEDED
Status: DISCONTINUED | OUTPATIENT
Start: 2024-03-20 | End: 2024-03-20 | Stop reason: SURG

## 2024-03-20 RX ORDER — PROTAMINE SULFATE 10 MG/ML
INJECTION, SOLUTION INTRAVENOUS ONCE
Status: CANCELLED | OUTPATIENT
Start: 2024-03-20 | End: 2024-03-20

## 2024-03-20 RX ORDER — BUPIVACAINE HYDROCHLORIDE 5 MG/ML
INJECTION, SOLUTION PERINEURAL AS NEEDED
Status: DISCONTINUED | OUTPATIENT
Start: 2024-03-20 | End: 2024-03-20 | Stop reason: HOSPADM

## 2024-03-20 RX ORDER — SODIUM CHLORIDE, SODIUM LACTATE, POTASSIUM CHLORIDE, CALCIUM CHLORIDE 600; 310; 30; 20 MG/100ML; MG/100ML; MG/100ML; MG/100ML
9 INJECTION, SOLUTION INTRAVENOUS CONTINUOUS
Status: DISCONTINUED | OUTPATIENT
Start: 2024-03-20 | End: 2024-03-23 | Stop reason: HOSPADM

## 2024-03-20 RX ORDER — SODIUM CHLORIDE 9 MG/ML
INJECTION, SOLUTION INTRAVENOUS AS NEEDED
Status: DISCONTINUED | OUTPATIENT
Start: 2024-03-20 | End: 2024-03-20 | Stop reason: HOSPADM

## 2024-03-20 RX ORDER — PROPOFOL 10 MG/ML
VIAL (ML) INTRAVENOUS AS NEEDED
Status: DISCONTINUED | OUTPATIENT
Start: 2024-03-20 | End: 2024-03-20 | Stop reason: SURG

## 2024-03-20 RX ORDER — PANTOPRAZOLE SODIUM 40 MG/10ML
40 INJECTION, POWDER, LYOPHILIZED, FOR SOLUTION INTRAVENOUS EVERY 12 HOURS SCHEDULED
Status: DISCONTINUED | OUTPATIENT
Start: 2024-03-20 | End: 2024-03-23 | Stop reason: HOSPADM

## 2024-03-20 RX ORDER — SODIUM CHLORIDE 0.9 % (FLUSH) 0.9 %
10 SYRINGE (ML) INJECTION AS NEEDED
Status: DISCONTINUED | OUTPATIENT
Start: 2024-03-20 | End: 2024-03-20 | Stop reason: HOSPADM

## 2024-03-20 RX ORDER — MORPHINE SULFATE 2 MG/ML
1 INJECTION, SOLUTION INTRAMUSCULAR; INTRAVENOUS EVERY 4 HOURS PRN
Status: DISCONTINUED | OUTPATIENT
Start: 2024-03-20 | End: 2024-03-23 | Stop reason: HOSPADM

## 2024-03-20 RX ORDER — ACETAMINOPHEN 650 MG/1
650 SUPPOSITORY RECTAL EVERY 4 HOURS PRN
Status: DISCONTINUED | OUTPATIENT
Start: 2024-03-20 | End: 2024-03-23 | Stop reason: HOSPADM

## 2024-03-20 RX ORDER — PHENYLEPHRINE HCL IN 0.9% NACL 1 MG/10 ML
SYRINGE (ML) INTRAVENOUS AS NEEDED
Status: DISCONTINUED | OUTPATIENT
Start: 2024-03-20 | End: 2024-03-20 | Stop reason: SURG

## 2024-03-20 RX ORDER — ONDANSETRON 2 MG/ML
4 INJECTION INTRAMUSCULAR; INTRAVENOUS ONCE
Status: COMPLETED | OUTPATIENT
Start: 2024-03-20 | End: 2024-03-20

## 2024-03-20 RX ORDER — ONDANSETRON 4 MG/1
4 TABLET, ORALLY DISINTEGRATING ORAL EVERY 6 HOURS PRN
Status: DISCONTINUED | OUTPATIENT
Start: 2024-03-20 | End: 2024-03-23 | Stop reason: HOSPADM

## 2024-03-20 RX ORDER — ACETAMINOPHEN 325 MG/1
650 TABLET ORAL EVERY 4 HOURS PRN
Status: DISCONTINUED | OUTPATIENT
Start: 2024-03-20 | End: 2024-03-23 | Stop reason: HOSPADM

## 2024-03-20 RX ORDER — SODIUM CHLORIDE 0.9 % (FLUSH) 0.9 %
10 SYRINGE (ML) INJECTION AS NEEDED
Status: DISCONTINUED | OUTPATIENT
Start: 2024-03-20 | End: 2024-03-23 | Stop reason: HOSPADM

## 2024-03-20 RX ORDER — SUCCINYLCHOLINE/SOD CL,ISO/PF 200MG/10ML
SYRINGE (ML) INTRAVENOUS AS NEEDED
Status: DISCONTINUED | OUTPATIENT
Start: 2024-03-20 | End: 2024-03-20 | Stop reason: SURG

## 2024-03-20 RX ORDER — FENTANYL CITRATE 50 UG/ML
INJECTION, SOLUTION INTRAMUSCULAR; INTRAVENOUS
Status: COMPLETED
Start: 2024-03-20 | End: 2024-03-20

## 2024-03-20 RX ORDER — SODIUM CHLORIDE, SODIUM LACTATE, POTASSIUM CHLORIDE, CALCIUM CHLORIDE 600; 310; 30; 20 MG/100ML; MG/100ML; MG/100ML; MG/100ML
9 INJECTION, SOLUTION INTRAVENOUS CONTINUOUS PRN
Status: DISCONTINUED | OUTPATIENT
Start: 2024-03-20 | End: 2024-03-23 | Stop reason: HOSPADM

## 2024-03-20 RX ORDER — ROCURONIUM BROMIDE 10 MG/ML
INJECTION, SOLUTION INTRAVENOUS AS NEEDED
Status: DISCONTINUED | OUTPATIENT
Start: 2024-03-20 | End: 2024-03-20 | Stop reason: SURG

## 2024-03-20 RX ORDER — GABAPENTIN 100 MG/1
100 CAPSULE ORAL 3 TIMES DAILY
Status: DISPENSED | OUTPATIENT
Start: 2024-03-20 | End: 2024-03-22

## 2024-03-20 RX ORDER — DROPERIDOL 2.5 MG/ML
0.62 INJECTION, SOLUTION INTRAMUSCULAR; INTRAVENOUS ONCE AS NEEDED
Status: DISCONTINUED | OUTPATIENT
Start: 2024-03-20 | End: 2024-03-20 | Stop reason: HOSPADM

## 2024-03-20 RX ORDER — HYDROMORPHONE HYDROCHLORIDE 1 MG/ML
0.5 INJECTION, SOLUTION INTRAMUSCULAR; INTRAVENOUS; SUBCUTANEOUS
Status: DISCONTINUED | OUTPATIENT
Start: 2024-03-20 | End: 2024-03-20 | Stop reason: HOSPADM

## 2024-03-20 RX ORDER — EPHEDRINE SULFATE 50 MG/ML
INJECTION INTRAVENOUS AS NEEDED
Status: DISCONTINUED | OUTPATIENT
Start: 2024-03-20 | End: 2024-03-20 | Stop reason: SURG

## 2024-03-20 RX ORDER — FENTANYL CITRATE 50 UG/ML
INJECTION, SOLUTION INTRAMUSCULAR; INTRAVENOUS AS NEEDED
Status: DISCONTINUED | OUTPATIENT
Start: 2024-03-20 | End: 2024-03-20 | Stop reason: SURG

## 2024-03-20 RX ORDER — PROTAMINE SULFATE 10 MG/ML
50 INJECTION, SOLUTION INTRAVENOUS ONCE
Qty: 5 ML | Refills: 0 | Status: COMPLETED | OUTPATIENT
Start: 2024-03-20 | End: 2024-03-20

## 2024-03-20 RX ORDER — FENTANYL CITRATE 50 UG/ML
50 INJECTION, SOLUTION INTRAMUSCULAR; INTRAVENOUS
Status: DISCONTINUED | OUTPATIENT
Start: 2024-03-20 | End: 2024-03-20 | Stop reason: SDUPTHER

## 2024-03-20 RX ORDER — POLYETHYLENE GLYCOL 3350 17 G/17G
17 POWDER, FOR SOLUTION ORAL DAILY PRN
Status: DISCONTINUED | OUTPATIENT
Start: 2024-03-20 | End: 2024-03-23 | Stop reason: HOSPADM

## 2024-03-20 RX ORDER — LIDOCAINE HYDROCHLORIDE 10 MG/ML
0.5 INJECTION, SOLUTION EPIDURAL; INFILTRATION; INTRACAUDAL; PERINEURAL ONCE AS NEEDED
Status: DISCONTINUED | OUTPATIENT
Start: 2024-03-20 | End: 2024-03-20 | Stop reason: HOSPADM

## 2024-03-20 RX ORDER — SODIUM CHLORIDE 9 MG/ML
40 INJECTION, SOLUTION INTRAVENOUS AS NEEDED
Status: DISCONTINUED | OUTPATIENT
Start: 2024-03-20 | End: 2024-03-20 | Stop reason: HOSPADM

## 2024-03-20 RX ORDER — ACETAMINOPHEN 160 MG/5ML
650 SOLUTION ORAL EVERY 4 HOURS PRN
Status: DISCONTINUED | OUTPATIENT
Start: 2024-03-20 | End: 2024-03-23 | Stop reason: HOSPADM

## 2024-03-20 RX ORDER — FUROSEMIDE 10 MG/ML
20 INJECTION INTRAMUSCULAR; INTRAVENOUS ONCE
Qty: 2 ML | Refills: 0 | Status: COMPLETED | OUTPATIENT
Start: 2024-03-20 | End: 2024-03-20

## 2024-03-20 RX ORDER — CARVEDILOL 12.5 MG/1
25 TABLET ORAL 2 TIMES DAILY WITH MEALS
Status: DISCONTINUED | OUTPATIENT
Start: 2024-03-20 | End: 2024-03-23 | Stop reason: HOSPADM

## 2024-03-20 RX ORDER — HYDROMORPHONE HYDROCHLORIDE 1 MG/ML
0.5 INJECTION, SOLUTION INTRAMUSCULAR; INTRAVENOUS; SUBCUTANEOUS
Status: DISCONTINUED | OUTPATIENT
Start: 2024-03-20 | End: 2024-03-20 | Stop reason: SDUPTHER

## 2024-03-20 RX ORDER — ONDANSETRON 2 MG/ML
4 INJECTION INTRAMUSCULAR; INTRAVENOUS EVERY 6 HOURS PRN
Status: DISCONTINUED | OUTPATIENT
Start: 2024-03-20 | End: 2024-03-23 | Stop reason: HOSPADM

## 2024-03-20 RX ADMIN — ONDANSETRON 4 MG: 2 INJECTION INTRAMUSCULAR; INTRAVENOUS at 19:54

## 2024-03-20 RX ADMIN — FENTANYL CITRATE 100 MCG: 50 INJECTION, SOLUTION INTRAMUSCULAR; INTRAVENOUS at 18:23

## 2024-03-20 RX ADMIN — PROPOFOL 150 MG: 10 INJECTION, EMULSION INTRAVENOUS at 18:23

## 2024-03-20 RX ADMIN — FENTANYL CITRATE 50 MCG: 50 INJECTION, SOLUTION INTRAMUSCULAR; INTRAVENOUS at 21:11

## 2024-03-20 RX ADMIN — EPHEDRINE SULFATE 5 MG: 50 INJECTION INTRAVENOUS at 18:52

## 2024-03-20 RX ADMIN — CARVEDILOL 25 MG: 12.5 TABLET, FILM COATED ORAL at 22:29

## 2024-03-20 RX ADMIN — SODIUM CHLORIDE 50 ML/HR: 9 INJECTION, SOLUTION INTRAVENOUS at 18:13

## 2024-03-20 RX ADMIN — Medication 140 MG: at 18:23

## 2024-03-20 RX ADMIN — SODIUM CHLORIDE 2 G: 900 INJECTION INTRAVENOUS at 18:23

## 2024-03-20 RX ADMIN — FENTANYL CITRATE 50 MCG: 50 INJECTION, SOLUTION INTRAMUSCULAR; INTRAVENOUS at 20:38

## 2024-03-20 RX ADMIN — Medication 100 MCG: at 18:26

## 2024-03-20 RX ADMIN — PROTAMINE SULFATE 50 MG: 10 INJECTION, SOLUTION INTRAVENOUS at 12:55

## 2024-03-20 RX ADMIN — FUROSEMIDE 20 MG: 10 INJECTION, SOLUTION INTRAMUSCULAR; INTRAVENOUS at 11:50

## 2024-03-20 RX ADMIN — LIDOCAINE HYDROCHLORIDE 50 MG: 10 INJECTION, SOLUTION EPIDURAL; INFILTRATION; INTRACAUDAL; PERINEURAL at 18:23

## 2024-03-20 RX ADMIN — IOPAMIDOL 85 ML: 612 INJECTION, SOLUTION INTRAVENOUS at 09:05

## 2024-03-20 RX ADMIN — SUGAMMADEX 200 MG: 100 INJECTION, SOLUTION INTRAVENOUS at 20:02

## 2024-03-20 RX ADMIN — Medication 100 MCG: at 18:35

## 2024-03-20 RX ADMIN — SODIUM CHLORIDE 1000 ML: 9 INJECTION, SOLUTION INTRAVENOUS at 08:09

## 2024-03-20 RX ADMIN — ALBUMIN (HUMAN): 12.5 INJECTION, SOLUTION INTRAVENOUS at 18:56

## 2024-03-20 RX ADMIN — SODIUM CHLORIDE, POTASSIUM CHLORIDE, SODIUM LACTATE AND CALCIUM CHLORIDE 100 ML/HR: 600; 310; 30; 20 INJECTION, SOLUTION INTRAVENOUS at 22:31

## 2024-03-20 RX ADMIN — Medication 100 MCG: at 18:40

## 2024-03-20 RX ADMIN — ROCURONIUM BROMIDE 45 MG: 10 INJECTION INTRAVENOUS at 18:33

## 2024-03-20 RX ADMIN — DEXAMETHASONE SODIUM PHOSPHATE 4 MG: 4 INJECTION INTRA-ARTICULAR; INTRALESIONAL; INTRAMUSCULAR; INTRAVENOUS; SOFT TISSUE at 18:23

## 2024-03-20 RX ADMIN — Medication 100 MCG: at 18:50

## 2024-03-20 RX ADMIN — ONDANSETRON 4 MG: 2 INJECTION INTRAMUSCULAR; INTRAVENOUS at 08:30

## 2024-03-20 RX ADMIN — HYDROMORPHONE HYDROCHLORIDE 0.25 MG: 1 INJECTION, SOLUTION INTRAMUSCULAR; INTRAVENOUS; SUBCUTANEOUS at 10:55

## 2024-03-20 RX ADMIN — ROCURONIUM BROMIDE 5 MG: 10 INJECTION INTRAVENOUS at 18:23

## 2024-03-20 RX ADMIN — SODIUM CHLORIDE, POTASSIUM CHLORIDE, SODIUM LACTATE AND CALCIUM CHLORIDE 9 ML/HR: 600; 310; 30; 20 INJECTION, SOLUTION INTRAVENOUS at 15:06

## 2024-03-20 RX ADMIN — SODIUM CHLORIDE, POTASSIUM CHLORIDE, SODIUM LACTATE AND CALCIUM CHLORIDE: 600; 310; 30; 20 INJECTION, SOLUTION INTRAVENOUS at 18:13

## 2024-03-20 RX ADMIN — PANTOPRAZOLE SODIUM 40 MG: 40 INJECTION, POWDER, FOR SOLUTION INTRAVENOUS at 12:48

## 2024-03-20 RX ADMIN — OXYCODONE HYDROCHLORIDE AND ACETAMINOPHEN 1 TABLET: 7.5; 325 TABLET ORAL at 23:25

## 2024-03-20 NOTE — ED NOTES
Maryana Freeman    Nursing Report ED to Floor:  Mental status: A&O X4  Ambulatory status: up with 2  Oxygen Therapy:  RA  Cardiac Rhythm: afib  Admitted from: ED  Safety Concerns:  post op bleeding  Social Issues: none  ED Room #:  6    ED Nurse Phone Extension - [phone] or may call [phone].      HPI:   Chief Complaint   Patient presents with    Post-op Problem       Past Medical History:  Past Medical History:   Diagnosis Date    Abnormal ECG     Atrial fibrillation     Diverticulosis     Female bladder prolapse     PONV (postoperative nausea and vomiting)     Uterine prolapse         Past Surgical History:  Past Surgical History:   Procedure Laterality Date    ANTERIOR VAGINAL REPAIR N/A 3/13/2024    Procedure: ANTERIOR AND POSTERIOR REPAIR;  Surgeon: Zayra Chong MD;  Location:  [facility];  Service: Gynecology;  Laterality: N/A;    CARDIOVERSION  2014    COLONOSCOPY      x3    CYSTOSCOPY N/A 3/13/2024    Procedure: CYSTOSCOPY;  Surgeon: Zayra Chong MD;  Location:  [facility];  Service: Robotics - DaVinci;  Laterality: N/A;    HIP SURGERY Bilateral     bilateral hip arthroplasty    KNEE ARTHROPLASTY Right     TOTAL LAPAROSCOPIC HYSTERECTOMY VAGINAL VAULT SUSPENSION N/A 3/13/2024    Procedure: ROBOTIC ASSISTED HYSTERECTOMY WITH BILATERAL SALPINGOOPHORECTOMY, UTEROSACRAL LIGAMENT VAGINAL VAULT SUSPENSION;  Surgeon: Zayra Chong MD;  Location:  [facility];  Service: Robotics - DaVinci;  Laterality: N/A;        Admitting Doctor:   Zayra Chong MD    Consulting Provider(s):  Consults       Date and Time Order Name Status Description    3/13/2024  1:31 PM Inpatient Hospitalist Consult Completed              Admitting Diagnosis:   The primary encounter diagnosis was Postoperative anemia due to acute blood loss. Diagnoses of Acute abdominal pain, Anemia, unspecified type, Free fluid in pelvis, Post-op pain, Referred by health care professional, and S/P  robotic assisted hysterectomy  with bilateral salpingo-oophorectomy, lysis of adhesions, and anterior repair, cystoscopy were also pertinent to this visit.    Most Recent Vitals:   Vitals:    03/20/24 1000 03/20/24 1030 03/20/24 1100 03/20/24 1150   BP: 109/68 108/47  123/65   BP Location:       Patient Position:       Pulse: 96 114 94 106   Resp:       Temp:       TempSrc:       SpO2: 99% 100% 100%    Weight:       Height:           Active LDAs/IV Access:   Lines, Drains & Airways       Active LDAs       Name Placement date Placement time Site Days    Peripheral IV 03/20/24 0805 Right Antecubital 03/20/24  0805  Antecubital  less than 1                    Labs (abnormal labs have a star):   Labs Reviewed   COMPREHENSIVE METABOLIC PANEL - Abnormal; Notable for the following components:       Result Value    Glucose 151 (*)     Creatinine 1.20 (*)     Sodium 131 (*)     Chloride 97 (*)     eGFR 49.1 (*)     All other components within normal limits    Narrative:     GFR Normal >60  Chronic Kidney Disease <60  Kidney Failure <15     SINGLE HSTROPONIN T - Abnormal; Notable for the following components:    HS Troponin T 17 (*)     All other components within normal limits    Narrative:     High Sensitive Troponin T Reference Range:  <14.0 ng/L- Negative Female for AMI  <22.0 ng/L- Negative Male for AMI  >=14 - Abnormal Female indicating possible myocardial injury.  >=22 - Abnormal Male indicating possible myocardial injury.   Clinicians would have to utilize clinical acumen, EKG, Troponin, and serial changes to determine if it is an Acute Myocardial Infarction or myocardial injury due to an underlying chronic condition.        CBC WITH AUTO DIFFERENTIAL - Abnormal; Notable for the following components:    WBC 11.13 (*)     RBC 2.52 (*)     Hemoglobin 7.5 (*)     Hematocrit 22.8 (*)     Neutrophil % 81.5 (*)     Lymphocyte % 9.9 (*)     Immature Grans % 0.8 (*)     Neutrophils, Absolute 9.08 (*)     Immature Grans, Absolute 0.09 (*)     All other  components within normal limits   POCT OCCULT BLOOD STOOL - Abnormal; Notable for the following components:    Fecal Occult Blood Positive (*)     All other components within normal limits   LIPASE - Normal   BNP (IN-HOUSE) - Normal    Narrative:     This assay is used as an aid in the diagnosis of individuals suspected of having heart failure. It can be used as an aid in the diagnosis of acute decompensated heart failure (ADHF) in patients presenting with signs and symptoms of ADHF to the emergency department (ED). In addition, NT-proBNP of <300 pg/mL indicates ADHF is not likely.    Age Range Result Interpretation  NT-proBNP Concentration (pg/mL:      <50             Positive            >450                   Gray                 300-450                    Negative             <300    50-75           Positive            >900                  Gray                300-900                  Negative            <300      >75             Positive            >1800                  Gray                300-1800                  Negative            <300   LACTIC ACID, PLASMA - Normal   URINALYSIS W/ MICROSCOPIC IF INDICATED (NO CULTURE)   TYPE AND SCREEN   ABORH 2ND SPECIMEN VERIFICATION   PREPARE RBC   CBC AND DIFFERENTIAL    Narrative:     The following orders were created for panel order CBC & Differential.  Procedure                               Abnormality         Status                     ---------                               -----------         ------                     CBC Auto Differential[[id number]]        Abnormal            Final result                 Please view results for these tests on the individual orders.       Meds Given in ED:   Medications   sodium chloride 0.9 % flush 10 mL (has no administration in time range)   sodium chloride 0.9 % bolus 1,000 mL (0 mL Intravenous Stopped 3/20/24 0855)   ondansetron (ZOFRAN) injection 4 mg (4 mg Intravenous Given 3/20/24 0830)   iopamidol (ISOVUE-300) 61 %  injection 100 mL (85 mL Intravenous Given 3/20/24 0905)   HYDROmorphone (DILAUDID) injection 0.25 mg (0.25 mg Intravenous Given 3/20/24 1055)   furosemide (LASIX) injection 20 mg (20 mg Intravenous Given 3/20/24 1150)           Last NIH score:                                                          Dysphagia screening results:        Meghan Coma Scale:  No data recorded     CIWA:        Restraint Type:            Isolation Status:  No active isolations

## 2024-03-20 NOTE — ED PROVIDER NOTES
Subjective   History of Present Illness  Pleasant patient who had a hysterectomy and bladder repair by Dr. Barreto last week.  There is a history of A-fib and she is on Xarelto.  She tells me she felt pretty good up until 3 days ago when she started developing some pretty severe nausea and diffuse abdominal pain.  She does appear pale.  She has had some diarrhea as well that she describes as watery but not bloody or dark or black.  She tells me she spoke to her surgeon personally and was advised to go to the ER for further evaluation.  She denies any chest pain difficulty breathing.        Review of Systems    Past Medical History:   Diagnosis Date    Abnormal ECG     Atrial fibrillation     Diverticulosis     Female bladder prolapse     PONV (postoperative nausea and vomiting)     Uterine prolapse        Allergies   Allergen Reactions    Wound Dressing Adhesive Dermatitis     blisters       Past Surgical History:   Procedure Laterality Date    ANTERIOR VAGINAL REPAIR N/A 3/13/2024    Procedure: ANTERIOR AND POSTERIOR REPAIR;  Surgeon: Zayra Chong MD;  Location:  [facility];  Service: Gynecology;  Laterality: N/A;    CARDIOVERSION  2014    COLONOSCOPY      x3    CYSTOSCOPY N/A 3/13/2024    Procedure: CYSTOSCOPY;  Surgeon: Zayra Chong MD;  Location:  [facility];  Service: Robotics - DaVinci;  Laterality: N/A;    HIP SURGERY Bilateral     bilateral hip arthroplasty    KNEE ARTHROPLASTY Right     TOTAL LAPAROSCOPIC HYSTERECTOMY VAGINAL VAULT SUSPENSION N/A 3/13/2024    Procedure: ROBOTIC ASSISTED HYSTERECTOMY WITH BILATERAL SALPINGOOPHORECTOMY, UTEROSACRAL LIGAMENT VAGINAL VAULT SUSPENSION;  Surgeon: Zayra Chong MD;  Location:  [facility];  Service: Robotics - DaVinci;  Laterality: N/A;       Family History   Problem Relation Age of Onset    Arrhythmia Mother     Alzheimer's disease Mother     Hyperlipidemia Father     Arrhythmia Sister     No Known Problems Brother     No Known Problems  Brother        Social History     Socioeconomic History    Marital status:    Tobacco Use    Smoking status: Never    Smokeless tobacco: Never   Vaping Use    Vaping status: Never Used   Substance and Sexual Activity    Alcohol use: Yes     Alcohol/week: 2.0 - 3.0 standard drinks of alcohol     Types: 2 - 3 Standard drinks or equivalent per week     Comment: occas    Drug use: Never    Sexual activity: Defer           Objective   Physical Exam  Constitutional:       Appearance: She is well-developed. She is ill-appearing.   HENT:      Head: Normocephalic and atraumatic.      Right Ear: External ear normal.      Left Ear: External ear normal.      Nose: Nose normal.   Eyes:      Conjunctiva/sclera: Conjunctivae normal.      Pupils: Pupils are equal, round, and reactive to light.   Cardiovascular:      Rate and Rhythm: Normal rate and regular rhythm.      Heart sounds: Normal heart sounds.   Pulmonary:      Effort: Pulmonary effort is normal.      Breath sounds: Normal breath sounds.   Abdominal:      General: Bowel sounds are normal.      Palpations: Abdomen is soft.      Tenderness: There is abdominal tenderness (Diffuse abdominal tenderness.  Small areas of ecchymosis noted).   Musculoskeletal:         General: Normal range of motion.      Cervical back: Normal range of motion and neck supple.   Skin:     General: Skin is warm and dry.      Coloration: Skin is pale.   Neurological:      Mental Status: She is alert and oriented to person, place, and time.   Psychiatric:         Behavior: Behavior normal.         Thought Content: Thought content normal.         Judgment: Judgment normal.         Critical Care    Performed by: José Urban APRN  Authorized by: Tyron Hobbs MD    Critical care provider statement:     Critical care time (minutes):  35    Critical care time was exclusive of:  Separately billable procedures and treating other patients    Critical care was necessary to treat or prevent imminent  or life-threatening deterioration of the following conditions: anemia. blood trans. post op bleeding.    Critical care was time spent personally by me on the following activities:  Ordering and performing treatments and interventions, ordering and review of laboratory studies, ordering and review of radiographic studies, pulse oximetry, re-evaluation of patient's condition, review of old charts, obtaining history from patient or surrogate, examination of patient, evaluation of patient's response to treatment, discussions with consultants and development of treatment plan with patient or surrogate             ED Course  ED Course as of 03/20/24 1135   Wed Mar 20, 2024   0815 Broad differential including internal bleeding.  Infection.  She does appear to be anticoagulated with Xarelto and currently in A-fib.  Will get a CAT scan lab work and I will speak to her surgeon once we get more information.  Systolic is around 108 systolic.  Need to be careful with pain medication as this could make her hypotensive.  We are currently giving her IV fluids. [JM]   0930 Awaiting CAT scan results [JM]   1005 I am on hold with [facility].  They advised Dr. Barreto is currently in surgery.  I am awaiting to speak to her on call. [JM]   1012 I spoke to her staff and they are trying to reach her one of her associates.  I am currently on hold with our OR here at [facility] tried to reach her through the operating room. [JM]   1015 Spoke to the operating room nurse who relayed my concerns about the CAT scan findings and lab work to her while she was scrubbed in.  They advised they will be down to see her in the next 15 to 20 minutes. [JM]   1132 I spoke with Tammy Beard. Advised to admit to Dr. Chong. [JM]      ED Course User Index  [JM] José Urban APRN                                 CT Abdomen Pelvis With Contrast   Final Result   Impression:   1.Large volume free fluid in the abdomen and pelvis concerning for urinary bladder  leak in absence of other etiologies for free fluid.   2.Bilateral total hip arthroplasties with associated artifact partially obscuring the lower pelvis including urinary bladder.            Electronically Signed: Tyron Baxter MD     3/20/2024 9:20 AM EDT     Workstation ID: [id number]      XR Chest 1 View   Final Result   Impression:   No acute process identified.         Electronically Signed: Tyron Baxter MD     3/20/2024 8:31 AM EDT     Workstation ID: [id number]                    Medical Decision Making  Problems Addressed:  Acute abdominal pain: complicated acute illness or injury  Anemia, unspecified type: complicated acute illness or injury  Free fluid in pelvis: complicated acute illness or injury  Post-op pain: complicated acute illness or injury  Referred by health care professional: complicated acute illness or injury    Amount and/or Complexity of Data Reviewed  External Data Reviewed: labs, radiology and ECG.  Labs: ordered. Decision-making details documented in ED Course.  Radiology: ordered. Decision-making details documented in ED Course.  ECG/medicine tests: ordered. Decision-making details documented in ED Course.    Risk  Prescription drug management.  Decision regarding hospitalization.    Critical Care  Total time providing critical care: 35 minutes        Final diagnoses:   Acute abdominal pain   Anemia, unspecified type   Free fluid in pelvis   Post-op pain   Referred by health care professional       ED Disposition  ED Disposition       ED Disposition   Decision to Admit    Condition   --    Comment   Level of Care: Telemetry [5]   Admitting Physician: YADIRA SIMMONS [[id number]]                 No follow-up provider specified.       Medication List      No changes were made to your prescriptions during this visit.            José Urban APRN  03/20/24 1134       José Urban APRN  03/20/24 1135

## 2024-03-20 NOTE — H&P
Patient Care Team:  Astrid Smalls MD as PCP - General (Internal Medicine)  Brenda Rodgers MD as Consulting Physician (Cardiology)    Chief complaint abdominal pain distention postoperatively 1 week from hysterectomy.    Subjective     Patient is a 68 y.o. female presents with worsening onset of abdominal pain cramping and started to get dizziness and lightheadedness at home this morning.  She called yesterday with some diarrhea and cramping symptoms she was told to discontinue stool softeners and other measures that did not think make things much better overnight.  She called in the morning which was today and complained of feeling dizzy lightheaded and with some abdominal pain.  I told her to proceed to the emergency room where workup has been done.  She had CT scan which shows large amount of free fluid.  I went and looked at the CT scan images with radiology and the images are little hard to see at the level of the bladder just because she has bilateral hip replacement and metal in her hips.  But the white bladder part we did see looked normal and her ureters were normal all the way up as well with no extravasation of that.  So I do not think there is a bladder or ureter injury.  The main issue is that I think she has been on anticoagulation full anticoagulation with Lovenox twice daily because of her atrial fibrillation that was started after surgery.  I think clinically she is just had some slow bleeding from this anticoagulation which has now resulted into a significant abdominal pain and anemia.  So at this point after talking to her and her daughter we have decided to admit her and give her an IV blood transfusion and then go and laparoscopically and evaluate for any active bleeding.  I have consulted the hospitalist that had been following her on her last week's admission after surgery and we will probably have to leave her off anticoagulation at least off full anticoagulation because  of the risk of bleeding.  With her atrial fibrillation we will just have to take that as it comes.  She is in chronic atrial fibrillation and tolerates it well as most of this is asymptomatic.  So the patient was counseled about all of this and she understands the necessity.  And consents to proceeding on with a laparoscopy and hospital admission for stability and holding full anticoagulation until hemostasis can occur.    Review of Systems   Pertinent items are noted in HPI    History  Past Medical History:   Diagnosis Date    Abnormal ECG     Atrial fibrillation     Diverticulosis     Female bladder prolapse     PONV (postoperative nausea and vomiting)     Uterine prolapse      Past Surgical History:   Procedure Laterality Date    ANTERIOR VAGINAL REPAIR N/A 3/13/2024    Procedure: ANTERIOR AND POSTERIOR REPAIR;  Surgeon: Zayra Chong MD;  Location:  [facility];  Service: Gynecology;  Laterality: N/A;    CARDIOVERSION  2014    COLONOSCOPY      x3    CYSTOSCOPY N/A 3/13/2024    Procedure: CYSTOSCOPY;  Surgeon: Zayra Chong MD;  Location:  [facility];  Service: Robotics - DaVinci;  Laterality: N/A;    HIP SURGERY Bilateral     bilateral hip arthroplasty    KNEE ARTHROPLASTY Right     TOTAL LAPAROSCOPIC HYSTERECTOMY VAGINAL VAULT SUSPENSION N/A 3/13/2024    Procedure: ROBOTIC ASSISTED HYSTERECTOMY WITH BILATERAL SALPINGOOPHORECTOMY, UTEROSACRAL LIGAMENT VAGINAL VAULT SUSPENSION;  Surgeon: Zayra Chong MD;  Location:  [facility];  Service: Robotics - DaVinci;  Laterality: N/A;     Family History   Problem Relation Age of Onset    Arrhythmia Mother     Alzheimer's disease Mother     Hyperlipidemia Father     Arrhythmia Sister     No Known Problems Brother     No Known Problems Brother      Social History     Tobacco Use    Smoking status: Never    Smokeless tobacco: Never   Vaping Use    Vaping status: Never Used   Substance Use Topics    Alcohol use: Yes     Alcohol/week: 2.0 - 3.0  standard drinks of alcohol     Types: 2 - 3 Standard drinks or equivalent per week     Comment: occas    Drug use: Never     E-cigarette/Vaping    E-cigarette/Vaping Use Never User      E-cigarette/Vaping Substances     (Not in a hospital admission)    Allergies:    Allergies   Allergen Reactions    Wound Dressing Adhesive Dermatitis     blisters       Objective     Vital Signs  Temp:  [97.9 °F (36.6 °C)-98.6 °F (37 °C)] 97.9 °F (36.6 °C)  Heart Rate:  [] 101  Resp:  [18-20] 20  BP: (108-127)/(40-84) 112/65    Physical Exam:      General Appearance:  Alert, cooperative, in no acute distress   Head:  Normocephalic, without obvious abnormality, atraumatic   Eyes:  Lids and lashes normal, conjunctivae and sclerae normal, no icterus, no pallor, corneas clear, PERRLA   Ears:  Ears appear intact with no abnormalities noted   Throat:  No oral lesions, no thrush, oral mucosa moist   Neck:  No adenopathy, supple, trachea midline, no thyromegaly, no carotid bruit, no JVD   Back:  No kyphosis present, no scoliosis present, no skin lesions, erythema or scars, no tenderness to percussion, or palpation, range of motion normal   Lungs:  Clear to auscultation,respirations regular, even and unlabored    Heart:  Regular rhythm and normal rate, normal S1 and S2, no murmur, no gallop, no rub, no click   Breast Exam:  Deferred   Abdomen:  Mildly distended but not hard.  It is tender when you push on it she complains mostly of a cramping symptom.     There is no active vaginal bleeding.   Extremities:  Moves all extremities well, no edema, no cyanosis, no redness   Pulses:  Pulses palpable and equal bilaterally   Skin:  Patient has appropriate bruising around her laparoscopic surgical sites.  She does feel and appear pale consistent with her anemia.   Lymph nodes:  No palpable adenopathy   Neurologic:  Cranial nerves 2 - 12 grossly intact, sensation intact, DTR present and equal bilaterally       Results Review:    I reviewed the  patient's new clinical results.    Assessment & Plan       Postoperative anemia due to acute blood loss    Atrial fibrillation, chronic    Essential hypertension    S/P  robotic assisted hysterectomy with bilateral salpingo-oophorectomy, lysis of adhesions, and anterior repair, cystoscopy    Chronic anticoagulation      Patient with postop bleeding due to the unfortunate need to being on therapeutic blood thinning.  Has had this postop bleeding complication.  Will consult hospital medicine to aid with her atrial fibrillation issues to see what would be best for that.  As far as surgery discontinue the anticoagulation for now until she is stable do transfusions.  She will be admitted and followed to confirm hemostasis stability.

## 2024-03-20 NOTE — H&P
Patient Name: Maryana Freeman  MRN: [id number]  : 1955  DOS: 3/20/2024    Attending: Tyron Hobbs MD    Primary Care Provider: Astrid Smalls MD      Chief complaint: Fatigue    History of present illness:    Ms. Freeman is a very pleasant 68-year-old female who is known to me from recent hospitalization to [facility] under the care of Dr. Chong with gynecology.    She underwent the following procedures, robotic assisted hysterectomy with bilateral salpingo-oophorectomy, lysis of adhesions, and anterior repair, cystoscopy, surgery was done on 3/13/2024, she did very well postoperatively, had her pain controlled, labs stable, tolerating p.o. diet, ambulated, and voided prior to being discharged home on postop day 1.    With her history of A-fib and chronic anticoagulation with Xarelto it was felt prudent to resume anticoagulants with therapeutic Lovenox instead of Xarelto initially.    Patient has been taking her Lovenox with the last injection being yesterday evening.  She has done well until about 3 days ago when she started feeling quite weak and nauseated.  She had diarrhea since then but no hematochezia no melena.  No hematemesis.    She contacted her surgeon who advised her to come to the ED.  Her eval in the emergency department showed her to be markedly anemic with hemoglobin of 7.5.  This is down from 11.6 at the day of discharge 6 days ago.    ED workup also included stool Hemoccult that was positive.  CT scan of the abdomen pelvis done, noted.  Free fluid observed.    When I saw her in the ED she is feeling weak with dyspnea with minimal exertion.  Somewhat pale.  Blood pressure systolic 120s.  Heart rate around 100-110 resting.    No chest pain.  No angina equivalent.         Allergies   Allergen Reactions    Wound Dressing Adhesive Dermatitis     blisters       Meds:  Carvedilol, losartan, probiotic, and terazosin along with therapeutic Lovenox till Xarelto was to  "resume         Past Medical History:   Diagnosis Date    Abnormal ECG     Atrial fibrillation     Diverticulosis     Female bladder prolapse     PONV (postoperative nausea and vomiting)     Uterine prolapse      Past Surgical History:   Procedure Laterality Date    ANTERIOR VAGINAL REPAIR N/A 3/13/2024    Procedure: ANTERIOR AND POSTERIOR REPAIR;  Surgeon: Zayra Chong MD;  Location:  [facility];  Service: Gynecology;  Laterality: N/A;    CARDIOVERSION  2014    COLONOSCOPY      x3    CYSTOSCOPY N/A 3/13/2024    Procedure: CYSTOSCOPY;  Surgeon: Zayra Chong MD;  Location:  [facility];  Service: Robotics - DaVinci;  Laterality: N/A;    HIP SURGERY Bilateral     bilateral hip arthroplasty    KNEE ARTHROPLASTY Right     TOTAL LAPAROSCOPIC HYSTERECTOMY VAGINAL VAULT SUSPENSION N/A 3/13/2024    Procedure: ROBOTIC ASSISTED HYSTERECTOMY WITH BILATERAL SALPINGOOPHORECTOMY, UTEROSACRAL LIGAMENT VAGINAL VAULT SUSPENSION;  Surgeon: Zayra Chong MD;  Location:  [facility];  Service: Robotics - DaVinci;  Laterality: N/A;     Family History   Problem Relation Age of Onset    Arrhythmia Mother     Alzheimer's disease Mother     Hyperlipidemia Father     Arrhythmia Sister     No Known Problems Brother     No Known Problems Brother      Social History     Tobacco Use    Smoking status: Never    Smokeless tobacco: Never   Vaping Use    Vaping status: Never Used   Substance Use Topics    Alcohol use: Yes     Alcohol/week: 2.0 - 3.0 standard drinks of alcohol     Types: 2 - 3 Standard drinks or equivalent per week     Comment: occas    Drug use: Never       Review of Systems  Pertinent items are noted in HPI    Vital Signs  /84   Pulse 102   Temp 98.6 °F (37 °C)   Resp 20   Ht 162.6 cm (64\")   Wt 95.7 kg (211 lb)   SpO2 100%   BMI 36.22 kg/m²     Physical Exam:    General Appearance:  Tired appearing in no acute distress   Head:    Normocephalic, without obvious abnormality, atraumatic   Eyes:  " "          Lids and lashes normal, conjunctivae and sclerae normal, no   icterus, no pallor, corneas clear,    Ears:    Ears appear intact with no abnormalities noted   Throat:   No oral lesions, no thrush, oral mucosa moist   Neck:   No adenopathy, supple, trachea midline, no thyromegaly         Lungs:     Clear to auscultation,respirations regular, even and                   unlabored    Heart:    Regular rhythm and normal rate, normal S1 and S2, no            murmur, no gallop   Abdomen:   Soft, few ecchymoses.  Incisions benign.  No significant tenderness or rebound tenderness.   Genitalia:    Deferred   Extremities:   No edema, no cyanosis, no              redness   Pulses:   Pulses palpable and equal bilaterally   Skin:   No bleeding, bruising or rash   Neurologic:   Cranial nerves 2 - 12 grossly intact,       I reviewed the patient's new clinical results.       Results from last 7 days   Lab Units 03/20/24  0808 03/14/24  0407   WBC 10*3/mm3 11.13* 6.98   HEMOGLOBIN g/dL 7.5* 11.6*   HEMATOCRIT % 22.8* 35.0   PLATELETS 10*3/mm3 264 199     Results from last 7 days   Lab Units 03/20/24  0808 03/14/24  0407   SODIUM mmol/L 131* 136   POTASSIUM mmol/L 4.2 4.2   CHLORIDE mmol/L 97* 102   CO2 mmol/L 23.0 24.0   BUN mg/dL 14 14   CREATININE mg/dL 1.20* 0.77   CALCIUM mg/dL 8.8 9.0   BILIRUBIN mg/dL 0.7  --    ALK PHOS U/L 51  --    ALT (SGPT) U/L 28  --    AST (SGOT) U/L 26  --    GLUCOSE mg/dL 151* 110*     No results found for: \"HGBA1C\"        Assessment and Plan:       Postoperative anemia due to acute blood loss    Atrial fibrillation, chronic    Essential hypertension    S/P  robotic assisted hysterectomy with bilateral salpingo-oophorectomy, lysis of adhesions, and anterior repair, cystoscopy    Chronic anticoagulation  Acute kidney failure.  Likely prerenal    Plan     Stat transfusion initially with 2 units of packed red blood cells, recheck H&H and consider further transfusion.  Reversal of Lovenox, " protamine sulfate, discussed with Pharm.D., dosing will be 0.5 mg/mg of Lovenox, will be given a dose of 50 mg of protamine sulfate.    Surgical exploration later today by Dr. Chong to address postop bleeding.    Volume resuscitation with blood and IV fluids, LR or normal saline.    Monitor labs including renal function and electrolytes.  That along with following H&H closely.    Monitor renal function with IV fluids and holding ARB.  Monitor volume status and urine output.  Expect quick resolution of patient's acute kidney failure.  If any further issues consider extending workup to include urine studies/electrolytes and possibly renal ultrasound.    Antihypertensive medications will be placed on hold.  Will consider resuming beta-blocker when blood pressure allows for heart rate control.  Consider briefly using digoxin if blood pressure is marginal.    N.p.o. at this point and expectation of surgery.    Consider consultation with patient's cardiologist perioperatively.  Likely will need to delay anticoagulation.    In light of heme positive stool I will start her on twice a day PPI and watch for any GI blood loss grossly.  Consider GI consultation if so.      Dragon disclaimer:  Part of this encounter note is an electronic transcription/translation of spoken language to printed text. The electronic translation of spoken language may permit erroneous, or at times, nonsensical words or phrases to be inadvertently transcribed; Although I have reviewed the note for such errors, some may still exist.    Hayder Perez MD  03/20/24  12:35 EDT

## 2024-03-20 NOTE — ANESTHESIA PREPROCEDURE EVALUATION
Anesthesia Evaluation     Patient summary reviewed and Nursing notes reviewed   history of anesthetic complications:  PONV  NPO Solid Status: > 8 hours  NPO Liquid Status: > 8 hours           Airway   Mallampati: II  TM distance: >3 FB  Neck ROM: full  No difficulty expected  Dental      Pulmonary     breath sounds clear to auscultation  Cardiovascular     ECG reviewed  Rhythm: irregular  Rate: abnormal    (+) hypertension, dysrhythmias Atrial Fib    ROS comment:   Echo 2/20: normal EF, mild MR/TR    Neuro/Psych  GI/Hepatic/Renal/Endo    (+) morbid obesity    Musculoskeletal     Abdominal    Substance History      OB/GYN          Other          Other Comment: One week post op from hysterectomy; bleeding in abdomen; HCT 22 --> patient receiving 2nd unit pRBCs in pre op  ROS/Med Hx Other:                   Anesthesia Plan    ASA 3 - emergent     general   Rapid sequence  intravenous induction     Anesthetic plan, risks, benefits, and alternatives have been provided, discussed and informed consent has been obtained with: patient.    CODE STATUS:

## 2024-03-20 NOTE — ANESTHESIA PROCEDURE NOTES
Airway  Urgency: elective    Date/Time: 3/20/2024 6:23 PM  Airway not difficult    General Information and Staff    Patient location during procedure: OR  Anesthesiologist: Alex Fernandez MD    Indications and Patient Condition  Indications for airway management: airway protection    Preoxygenated: yes  MILS not maintained throughout  Mask difficulty assessment: 1 - vent by mask    Final Airway Details  Final airway type: endotracheal airway      Successful airway: ETT  Cuffed: yes   Successful intubation technique: direct laryngoscopy and RSI  Facilitating devices/methods: cricoid pressure  Endotracheal tube insertion site: oral  Blade: Kady  Blade size: 3  ETT size (mm): 7.0  Cormack-Lehane Classification: grade I - full view of glottis  Placement verified by: chest auscultation and capnometry   Measured from: lips  ETT/EBT  to lips (cm): 20  Number of attempts at approach: 1  Assessment: lips, teeth, and gum same as pre-op and atraumatic intubation    Additional Comments  Negative epigastric sounds, Breath sound equal bilaterally with symmetric chest rise and fall

## 2024-03-21 PROBLEM — Z98.890 S/P LAPAROSCOPY: Status: ACTIVE | Noted: 2024-03-21

## 2024-03-21 LAB
ANION GAP SERPL CALCULATED.3IONS-SCNC: 9 MMOL/L (ref 5–15)
BH BB BLOOD EXPIRATION DATE: NORMAL
BH BB BLOOD EXPIRATION DATE: NORMAL
BH BB BLOOD TYPE BARCODE: 600
BH BB BLOOD TYPE BARCODE: 600
BH BB DISPENSE STATUS: NORMAL
BH BB DISPENSE STATUS: NORMAL
BH BB PRODUCT CODE: NORMAL
BH BB PRODUCT CODE: NORMAL
BH BB UNIT NUMBER: NORMAL
BH BB UNIT NUMBER: NORMAL
BUN SERPL-MCNC: 15 MG/DL (ref 8–23)
BUN/CREAT SERPL: 20 (ref 7–25)
CALCIUM SPEC-SCNC: 8.4 MG/DL (ref 8.6–10.5)
CHLORIDE SERPL-SCNC: 101 MMOL/L (ref 98–107)
CO2 SERPL-SCNC: 22 MMOL/L (ref 22–29)
CREAT SERPL-MCNC: 0.75 MG/DL (ref 0.57–1)
CROSSMATCH INTERPRETATION: NORMAL
CROSSMATCH INTERPRETATION: NORMAL
DEPRECATED RDW RBC AUTO: 50.4 FL (ref 37–54)
EGFRCR SERPLBLD CKD-EPI 2021: 86.3 ML/MIN/1.73
ERYTHROCYTE [DISTWIDTH] IN BLOOD BY AUTOMATED COUNT: 16.3 % (ref 12.3–15.4)
GLUCOSE SERPL-MCNC: 120 MG/DL (ref 65–99)
HCT VFR BLD AUTO: 22.6 % (ref 34–46.6)
HCT VFR BLD AUTO: 25.5 % (ref 34–46.6)
HGB BLD-MCNC: 7.4 G/DL (ref 12–15.9)
HGB BLD-MCNC: 8.2 G/DL (ref 12–15.9)
MCH RBC QN AUTO: 27.8 PG (ref 26.6–33)
MCHC RBC AUTO-ENTMCNC: 32.7 G/DL (ref 31.5–35.7)
MCV RBC AUTO: 85 FL (ref 79–97)
PLATELET # BLD AUTO: 199 10*3/MM3 (ref 140–450)
PMV BLD AUTO: 9.7 FL (ref 6–12)
POTASSIUM SERPL-SCNC: 4.4 MMOL/L (ref 3.5–5.2)
RBC # BLD AUTO: 2.66 10*6/MM3 (ref 3.77–5.28)
SODIUM SERPL-SCNC: 132 MMOL/L (ref 136–145)
UNIT  ABO: NORMAL
UNIT  ABO: NORMAL
UNIT  RH: NORMAL
UNIT  RH: NORMAL
WBC NRBC COR # BLD AUTO: 5.9 10*3/MM3 (ref 3.4–10.8)

## 2024-03-21 PROCEDURE — 25010000002 CEFAZOLIN PER 500 MG: Performed by: OBSTETRICS & GYNECOLOGY

## 2024-03-21 PROCEDURE — 85027 COMPLETE CBC AUTOMATED: CPT | Performed by: OBSTETRICS & GYNECOLOGY

## 2024-03-21 PROCEDURE — 97161 PT EVAL LOW COMPLEX 20 MIN: CPT

## 2024-03-21 PROCEDURE — 36430 TRANSFUSION BLD/BLD COMPNT: CPT

## 2024-03-21 PROCEDURE — 85018 HEMOGLOBIN: CPT | Performed by: OBSTETRICS & GYNECOLOGY

## 2024-03-21 PROCEDURE — 25810000003 LACTATED RINGERS PER 1000 ML: Performed by: OBSTETRICS & GYNECOLOGY

## 2024-03-21 PROCEDURE — 80048 BASIC METABOLIC PNL TOTAL CA: CPT | Performed by: OBSTETRICS & GYNECOLOGY

## 2024-03-21 PROCEDURE — 86900 BLOOD TYPING SEROLOGIC ABO: CPT

## 2024-03-21 PROCEDURE — 85014 HEMATOCRIT: CPT | Performed by: OBSTETRICS & GYNECOLOGY

## 2024-03-21 PROCEDURE — P9016 RBC LEUKOCYTES REDUCED: HCPCS

## 2024-03-21 RX ORDER — TERAZOSIN 1 MG/1
1 CAPSULE ORAL NIGHTLY
Status: DISCONTINUED | OUTPATIENT
Start: 2024-03-21 | End: 2024-03-23 | Stop reason: HOSPADM

## 2024-03-21 RX ORDER — LOSARTAN POTASSIUM 50 MG/1
100 TABLET ORAL DAILY
Status: DISCONTINUED | OUTPATIENT
Start: 2024-03-21 | End: 2024-03-23 | Stop reason: HOSPADM

## 2024-03-21 RX ADMIN — GABAPENTIN 100 MG: 100 CAPSULE ORAL at 16:31

## 2024-03-21 RX ADMIN — TERAZOSIN HYDROCHLORIDE 1 MG: 1 CAPSULE ORAL at 20:54

## 2024-03-21 RX ADMIN — SODIUM CHLORIDE, POTASSIUM CHLORIDE, SODIUM LACTATE AND CALCIUM CHLORIDE 100 ML/HR: 600; 310; 30; 20 INJECTION, SOLUTION INTRAVENOUS at 05:47

## 2024-03-21 RX ADMIN — GABAPENTIN 100 MG: 100 CAPSULE ORAL at 20:54

## 2024-03-21 RX ADMIN — CARVEDILOL 25 MG: 12.5 TABLET, FILM COATED ORAL at 18:07

## 2024-03-21 RX ADMIN — PANTOPRAZOLE SODIUM 40 MG: 40 INJECTION, POWDER, FOR SOLUTION INTRAVENOUS at 20:53

## 2024-03-21 RX ADMIN — GABAPENTIN 100 MG: 100 CAPSULE ORAL at 08:10

## 2024-03-21 RX ADMIN — SODIUM CHLORIDE 2000 MG: 900 INJECTION INTRAVENOUS at 01:55

## 2024-03-21 RX ADMIN — LOSARTAN POTASSIUM 100 MG: 50 TABLET, FILM COATED ORAL at 11:30

## 2024-03-21 RX ADMIN — PANTOPRAZOLE SODIUM 40 MG: 40 INJECTION, POWDER, FOR SOLUTION INTRAVENOUS at 08:10

## 2024-03-21 RX ADMIN — CARVEDILOL 25 MG: 12.5 TABLET, FILM COATED ORAL at 08:10

## 2024-03-21 RX ADMIN — SODIUM CHLORIDE 2000 MG: 900 INJECTION INTRAVENOUS at 11:30

## 2024-03-21 RX ADMIN — OXYCODONE HYDROCHLORIDE AND ACETAMINOPHEN 1 TABLET: 7.5; 325 TABLET ORAL at 08:20

## 2024-03-21 NOTE — PROGRESS NOTES
"IM progress note      Maryana Freeman  [id number]  1955     LOS: 1 day     Attending: Zayra Chong,*    Primary Care Provider: Astrid Smalls MD      Chief Complaint/Reason for visit:    Chief Complaint   Patient presents with    Post-op Problem       Subjective   Feels much better today following lap evacuation of peritoneal hematoma.  No n/vom/sob.   1 Unit PRBC infusing.   Good pain control.     Objective      Visit Vitals  /72   Pulse 81   Temp 98.3 °F (36.8 °C) (Oral)   Resp 18   Ht 162.6 cm (64\")   Wt 101 kg (223 lb 6.4 oz)   SpO2 96%   BMI 38.35 kg/m²     Temp (24hrs), Av.5 °F (36.9 °C), Min:97.9 °F (36.6 °C), Max:99.3 °F (37.4 °C)      Intake/Output:    Intake/Output Summary (Last 24 hours) at 3/21/2024 1016  Last data filed at 3/21/2024 0900  Gross per 24 hour   Intake 2511.6 ml   Output 1050 ml   Net 1461.6 ml          Physical Exam:     General Appearance:    Alert, cooperative, in no acute distress   Head:    Normocephalic, without obvious abnormality, atraumatic    Lungs:     Normal effort, symmetric chest rise,  clear to  auscultation bilaterally                 Heart:    Irregular rhythm and normal rate, normal S1 and S2   Abdomen:     Soft, clean incisions. Few ecchymoses. Benign.    Extremities:   No clubbing, cyanosis or edema.  No deformities.    Pulses:   Pulses palpable and equal bilaterally   Skin:   No bleeding, bruising or rash          Results Review:     I reviewed the patient's new clinical results.   Results from last 7 days   Lab Units 24  0616 24  0808   WBC 10*3/mm3 5.90 6.45  --  11.13*   HEMOGLOBIN g/dL 7.4* 8.6* 8.0* 7.5*   HEMATOCRIT % 22.6* 25.6* 24.4* 22.8*   PLATELETS 10*3/mm3 199 153  --  264     Results from last 7 days   Lab Units 24  0616 03/20/24  2216 03/20/24  0808   SODIUM mmol/L 132* 133* 131*   POTASSIUM mmol/L 4.4 4.3 4.2   CHLORIDE mmol/L 101 102 97*   CO2 mmol/L 22.0 18.0* 23.0   BUN " mg/dL 15 15 14   CREATININE mg/dL 0.75 0.99 1.20*   CALCIUM mg/dL 8.4* 8.1* 8.8   BILIRUBIN mg/dL  --   --  0.7   ALK PHOS U/L  --   --  51   ALT (SGPT) U/L  --   --  28   AST (SGOT) U/L  --   --  26   GLUCOSE mg/dL 120* 117* 151*     I reviewed the patient's new imaging including images and reports.    All medications reviewed.   carvedilol, 25 mg, Oral, BID With Meals  ceFAZolin, 2,000 mg, Intravenous, Q8H  gabapentin, 100 mg, Oral, TID  losartan, 100 mg, Oral, Daily  pantoprazole, 40 mg, Intravenous, Q12H  terazosin, 1 mg, Oral, Nightly        Assessment & Plan       S/P laparoscopy with evacuation of peritoneal hematoma. 3/20/23.    Atrial fibrillation, chronic    Essential hypertension    Chronic anticoagulation    Postoperative vaginal bleeding following genitourinary procedure    AKF, resolved.      Plan  1. Ambulation, encouraged. PT requested.  2. Pain control-prns   3. IS-encouraged  4. DVT proph-Mechanicals.   5. Bowel regimen  6. Diet, ADAT.  7. Monitor post-op labs  8. DC planning.    - Hypertension:  Resume home medications as appropriate, formulary substitution when indicated. Resumed Losartan and Terazosin.  Holding parameters.  Prn medications for elevated blood pressure.      -Chronic A-fib and chronic anticoagulation  Resumed Coreg.  Will remain off of anticoagulation for total of 7 days.  Will coordinate with patient and her daughter as well as Dr. Chong to have a lab check at 7 days before resuming Xarelto at that point  I discussed that with patient's cardiologist Dr. Rodgers who is in agreement.      Dragon disclaimer:  Part of this encounter note is an electronic transcription/translation of spoken language to printed text. The electronic translation of spoken language may permit erroneous, or at times, nonsensical words or phrases to be inadvertently transcribed; Although I have reviewed the note for such errors, some may still exist.    Hayder Perez MD  03/21/24  10:16  EDT

## 2024-03-21 NOTE — PROGRESS NOTES
Surgery Post-op Note  [email or web address]    Pre-Op Diagnosis Codes:     * S/P hysterectomy [Z90.710]     * Postoperative anemia due to acute blood loss [D62]    Post-Op Diagnosis Codes:     * S/P hysterectomy [Z90.710]     * Postoperative anemia due to acute blood loss [D62]    Procedure(s):  OPERATIVE LAPAROSCOPY EVACUCATION OF HEMOPERITONEUM  CYSTOSCOPY    Subjective     Feeling better. Abdominal pain and cramping is better.  Mainly just some surgical pain from incision is mild.      Objective   Physical Exam  Vitals:    03/21/24 0600   BP:    Pulse: 92   Resp:    Temp:    SpO2: 98%     General: awake, alert, comfortable    Pulm: CTAB    CVS: no M/R/G, reg rate    Abd: soft, NT, ND, NABS    Ext: warm, well perfused      Labs:  Lab Results (last 24 hours)       Procedure Component Value Units Date/Time    CBC (No Diff) [[id number]]  (Abnormal) Collected: 03/21/24 0616    Specimen: Blood Updated: 03/21/24 0648     WBC 5.90 10*3/mm3      RBC 2.66 10*6/mm3      Hemoglobin 7.4 g/dL      Hematocrit 22.6 %      MCV 85.0 fL      MCH 27.8 pg      MCHC 32.7 g/dL      RDW 16.3 %      RDW-SD 50.4 fl      MPV 9.7 fL      Platelets 199 10*3/mm3     Basic Metabolic Panel [[id number]] Collected: 03/21/24 0616    Specimen: Blood Updated: 03/21/24 0621    Basic Metabolic Panel [[id number]]  (Abnormal) Collected: 03/20/24 2216    Specimen: Blood Updated: 03/20/24 2305     Glucose 117 mg/dL      BUN 15 mg/dL      Creatinine 0.99 mg/dL      Sodium 133 mmol/L      Potassium 4.3 mmol/L      Comment: Slight hemolysis detected by analyzer. Result may be falsely elevated.        Chloride 102 mmol/L      CO2 18.0 mmol/L      Calcium 8.1 mg/dL      BUN/Creatinine Ratio 15.2     Anion Gap 13.0 mmol/L      eGFR 61.9 mL/min/1.73     Narrative:      GFR Normal >60  Chronic Kidney Disease <60  Kidney Failure <15      CBC (No Diff) [[id number]]  (Abnormal) Collected: 03/20/24 2216    Specimen: Blood Updated: 03/20/24 2255     WBC 6.45 10*3/mm3      RBC 3.03  10*6/mm3      Hemoglobin 8.6 g/dL      Hematocrit 25.6 %      MCV 84.5 fL      MCH 28.4 pg      MCHC 33.6 g/dL      RDW 15.3 %      RDW-SD 46.8 fl      MPV 10.3 fL      Platelets 153 10*3/mm3     Hemoglobin & Hematocrit, Blood [[id number]]  (Abnormal) Collected: 03/20/24 2144    Specimen: Blood Updated: 03/20/24 2149     Hemoglobin 8.0 g/dL      Hematocrit 24.4 %     POC Occult Blood Stool [[id number]]  (Abnormal) Resulted: 03/20/24 0923    Specimen: Stool from Per Rectum Updated: 03/20/24 0923     Fecal Occult Blood Positive     Lot Number [id number]     Expiration Date 10-26     DEVELOPER LOT NUMBER [id number]     DEVELOPER EXPIRATION DATE 2,027-8     Positive Control Positive     Negative Control Negative    Comprehensive Metabolic Panel [[id number]]  (Abnormal) Collected: 03/20/24 0808    Specimen: Blood Updated: 03/20/24 0855     Glucose 151 mg/dL      BUN 14 mg/dL      Creatinine 1.20 mg/dL      Sodium 131 mmol/L      Potassium 4.2 mmol/L      Chloride 97 mmol/L      CO2 23.0 mmol/L      Calcium 8.8 mg/dL      Total Protein 6.2 g/dL      Albumin 3.7 g/dL      ALT (SGPT) 28 U/L      AST (SGOT) 26 U/L      Alkaline Phosphatase 51 U/L      Total Bilirubin 0.7 mg/dL      Globulin 2.5 gm/dL      Comment: Calculated Result        A/G Ratio 1.5 g/dL      BUN/Creatinine Ratio 11.7     Anion Gap 11.0 mmol/L      eGFR 49.1 mL/min/1.73     Narrative:      GFR Normal >60  Chronic Kidney Disease <60  Kidney Failure <15      Lipase [[id number]]  (Normal) Collected: 03/20/24 0808    Specimen: Blood Updated: 03/20/24 0854     Lipase 28 U/L     BNP [[id number]]  (Normal) Collected: 03/20/24 0808    Specimen: Blood Updated: 03/20/24 0854     proBNP 558.7 pg/mL     Narrative:      This assay is used as an aid in the diagnosis of individuals suspected of having heart failure. It can be used as an aid in the diagnosis of acute decompensated heart failure (ADHF) in patients presenting with signs and symptoms of ADHF to the emergency department  (ED). In addition, NT-proBNP of <300 pg/mL indicates ADHF is not likely.    Age Range Result Interpretation  NT-proBNP Concentration (pg/mL:      <50             Positive            >450                   Gray                 300-450                    Negative             <300    50-75           Positive            >900                  Gray                300-900                  Negative            <300      >75             Positive            >1800                  Gray                300-1800                  Negative            <300    Single High Sensitivity Troponin T [[id number]]  (Abnormal) Collected: 03/20/24 0808    Specimen: Blood Updated: 03/20/24 0854     HS Troponin T 17 ng/L     Narrative:      High Sensitive Troponin T Reference Range:  <14.0 ng/L- Negative Female for AMI  <22.0 ng/L- Negative Male for AMI  >=14 - Abnormal Female indicating possible myocardial injury.  >=22 - Abnormal Male indicating possible myocardial injury.   Clinicians would have to utilize clinical acumen, EKG, Troponin, and serial changes to determine if it is an Acute Myocardial Infarction or myocardial injury due to an underlying chronic condition.         Lactic Acid, Plasma [[id number]]  (Normal) Collected: 03/20/24 0808    Specimen: Blood Updated: 03/20/24 0847     Lactate 1.5 mmol/L      Comment: Falsely depressed results may occur on samples drawn from patients receiving N-Acetylcysteine (NAC) or Metamizole.       CBC & Differential [[id number]]  (Abnormal) Collected: 03/20/24 0808    Specimen: Blood Updated: 03/20/24 0825    Narrative:      The following orders were created for panel order CBC & Differential.  Procedure                               Abnormality         Status                     ---------                               -----------         ------                     CBC Auto Differential[[id number]]        Abnormal            Final result                 Please view results for these tests on the  individual orders.    CBC Auto Differential [[id number]]  (Abnormal) Collected: 03/20/24 0808    Specimen: Blood Updated: 03/20/24 0825     WBC 11.13 10*3/mm3      RBC 2.52 10*6/mm3      Hemoglobin 7.5 g/dL      Hematocrit 22.8 %      MCV 90.5 fL      MCH 29.8 pg      MCHC 32.9 g/dL      RDW 13.9 %      RDW-SD 45.5 fl      MPV 9.8 fL      Platelets 264 10*3/mm3      Neutrophil % 81.5 %      Lymphocyte % 9.9 %      Monocyte % 5.5 %      Eosinophil % 2.0 %      Basophil % 0.3 %      Immature Grans % 0.8 %      Neutrophils, Absolute 9.08 10*3/mm3      Lymphocytes, Absolute 1.10 10*3/mm3      Monocytes, Absolute 0.61 10*3/mm3      Eosinophils, Absolute 0.22 10*3/mm3      Basophils, Absolute 0.03 10*3/mm3      Immature Grans, Absolute 0.09 10*3/mm3      nRBC 0.0 /100 WBC                 Intake and Output:    Intake/Output Summary (Last 24 hours) at 3/21/2024 0704  Last data filed at 3/21/2024 0600  Gross per 24 hour   Intake 3151.6 ml   Output 750 ml   Net 2401.6 ml       Assessment     The patient is a 68 y.o. female 1 Day Post-Op after op laparoscopy with evacuation of blood and clot from post op bleeding due to blood thinners for A fib.     Plan     D/c iv fluids  pt taking po well.   Cont postop care and watch h/h for any sign recurrent bleeding  A-fib, being monitored  Advance diet as tolerated     Zayra Chong MD  03/21/24  07:04 EDT

## 2024-03-21 NOTE — OP NOTE
GYN Operative Note    Patient Name, age and sex:  Maryana Freeman, 68 y.o., female  Procedure Date:  3/20/2024    Surgeons and Role:     * Zayra Chong MD - Primary        Pre-Op Diagnosis Codes:     * S/P hysterectomy [Z90.710]     * Postoperative anemia due to acute blood loss [D62]    Post-Op Diagnosis Codes:     * S/P hysterectomy [Z90.710]     * Postoperative anemia due to acute blood loss [D62]    Procedure(s):  DIAGNOSTIC LAPAROSCOPY  CYSTOSCOPY    Indications for Operation: Patient is a 68-year-old female who is 1 week status post laparoscopic robotic hysterectomy with anterior vaginal repair for prolapse who has atrial fibrillation and was discharged on Lovenox postoperatively had postop hemorrhage presented to the emergency room and therefore taken to surgery for postop bleeding.    Antibiotics:  cefazolin (Ancef) ordered on call to OR    Anesthesia:  Type: General -   ASA:  III    Operative Findings: The uterus is absent consistent with the hysterectomy.  There was no active bleeding the abdomen was filled with approximately 1500 cc of blood dark and lots of clots.  The bowel was stuck in the lower pelvis where the clots were as well but that was released with removal of the clots and irrigation.  There is no sign of any bowel injury.  There is no urine or other fluid within the blood it was all just blood.  The cystoscopy was normal as well with no injuries to the bladder and there was clear urine for spilling from each ureteral orifice.  The prolapse surgery was actually holding up really well there was a small clot in the vagina which was removed and the sutures were still all intact.  Most of the clot was at the vaginal cuff which was probably where the slow bleeding over the last week has occurred and clotted over time.    Specimens Removed:  None    Estimated Blood Loss: Minimal acute blood loss but approximately 1200 to 1500 old dark blood and clot    Urine Output: 200  mL    Complications: None    Procedure Narrative: Patient was taken the operating room where general anesthesia was found be adequate.  She is then prepped and draped normal sterile fashion dorsolithotomy position in the yellowfin stirrups.  The Carty catheter was placed.  Attention was then turned with abdomen sterile gloves a supraumbilical incision that had previous been used was opened only to a 5 mm size.  The abdominal wall was tented up and the varies needle was placed.  I confirmed placement the drop saline test and low patient pressure CO2.  The pneumoperitoneum was then created.  Then 2 assist ports were placed for traditional laparoscopy 1 on the left 1 on the right.  A 10 mm trocar was used on the right for the larger suction device.  Upon entering and in Trendelenburg there was lots of old dark liquidy blood in the abdomen and below the bladder flap and in the area of the cuff was many large clots.  These were irrigated and removed with the suction device and the bowel was adherent to the clots so I just gently use the water irrigation I was able to float the bowel away it was not densely adherent was just mainly stuck in the old clot tissue.  So once I removed all the clot freed the bowel up as well.  There is no visible sign of injury at all.  So then after sucking all the fluid out of the looked at the patient into a Trendelenburg and got the fluid while she was in Trendelenburg that was up around the liver and the mid abdomen.  We did some reverse Trendelenburg to let all the fluid emptied back down onto the pelvis and then I suctioned that out again to make sure to get all the clot and debris removed.  There was never any sign of active red bleeding all just a dark red old clotted blood.  Once all the irrigation and clot was evacuated which did take a long time proximately 2 hours I put some Munira hemostatic agent in the cuff area to help decrease risk of any further surgery site bleeding.  This  was not having any bleeding at all even with removal of the clots and even with low CO2 pressure.  So after about 10 to 15 minutes of inspection the Munira was nice and drive was keeping everything no bleeding.  So then I closed the 10 mm assist port by removing the trocar and placed the Ryan-Kranthi and 0 Vicryl suture.  Then the skin incisions were closed once all the trocars removed and pneumoperitoneum was all released.  There was no dressings used because of her skin allergies.  Subcuticular Monocryl's were used subcuticular.  Then the vagina was inspected it was noted to have a small clot this was evacuated there was no active bleeding.  The sutures were all intact.  Then I went and did the cystoscopy with sterile cystoscope.  The bladder was filled with 300 cc sterile saline the bladder itself was intact no defects no drainage no bleeding each ureter was inspected again and noted to have clear urine spilling both ureters.  Then a clean new Carty was placed to manage fluid management urine output.  She was then cleansed from all prep and any debris clean woke up from general anesthesia final count was correct.  She was taken recovery in stable condition.    Zayra Chong MD - 3/20/2024, 20:16 EDT

## 2024-03-21 NOTE — THERAPY EVALUATION
Patient Name: Maryana Freeman  : 1955    MRN: [id number]                              Today's Date: 3/21/2024       Admit Date: 3/20/2024    Visit Dx:     ICD-10-CM ICD-9-CM   1. Postoperative anemia due to acute blood loss  D62 285.1   2. Acute abdominal pain  R10.9 789.00     338.19   3. Anemia, unspecified type  D64.9 285.9   4. Free fluid in pelvis  R18.8 789.59   5. Post-op pain  G89.18 338.18   6. Referred by health care professional  Z02.89 V68.89   7. S/P  robotic assisted hysterectomy with bilateral salpingo-oophorectomy, lysis of adhesions, and anterior repair, cystoscopy  Z90.710 V88.01     Patient Active Problem List   Diagnosis    Atrial fibrillation, chronic    Non-ischemic cardiomyopathy    Essential hypertension    Uterine prolapse    Chronic anticoagulation    ABLA (acute blood loss anemia)    Postoperative vaginal bleeding following genitourinary procedure    S/P laparoscopy with evacuation of peritoneal hematoma. 3/20/23.     Past Medical History:   Diagnosis Date    Abnormal ECG     Atrial fibrillation     Diverticulosis     Female bladder prolapse     PONV (postoperative nausea and vomiting)     Uterine prolapse      Past Surgical History:   Procedure Laterality Date    ANTERIOR VAGINAL REPAIR N/A 3/13/2024    Procedure: ANTERIOR AND POSTERIOR REPAIR;  Surgeon: Zayra Chong MD;  Location: [facility];  Service: Gynecology;  Laterality: N/A;    CARDIOVERSION      COLONOSCOPY      x3    CYSTOSCOPY N/A 3/13/2024    Procedure: CYSTOSCOPY;  Surgeon: Zayra Chong MD;  Location:  [facility];  Service: Robotics - DaVinci;  Laterality: N/A;    CYSTOSCOPY N/A 3/20/2024    Procedure: CYSTOSCOPY;  Surgeon: Zayra Chong MD;  Location:  [facility];  Service: Gynecology;  Laterality: N/A;    DIAGNOSTIC LAPAROSCOPY N/A 3/20/2024    Procedure: OPERATIVE LAPAROSCOPY EVACUCATION OF HEMOPERITONEUM;  Surgeon: Zayra Chong MD;  Location:  [facility];  Service:  Gynecology;  Laterality: N/A;    HIP SURGERY Bilateral     bilateral hip arthroplasty    KNEE ARTHROPLASTY Right     TOTAL LAPAROSCOPIC HYSTERECTOMY VAGINAL VAULT SUSPENSION N/A 3/13/2024    Procedure: ROBOTIC ASSISTED HYSTERECTOMY WITH BILATERAL SALPINGOOPHORECTOMY, UTEROSACRAL LIGAMENT VAGINAL VAULT SUSPENSION;  Surgeon: Zayra Chong MD;  Location: [facility];  Service: Robotics - DaVinci;  Laterality: N/A;      General Information       Row Name 03/21/24 1409          Physical Therapy Time and Intention    Document Type evaluation  -     Mode of Treatment physical therapy  -       Row Name 03/21/24 1409          General Information    Patient Profile Reviewed yes  -     Prior Level of Function independent:;all household mobility;community mobility;gait;transfer;bed mobility  -     Existing Precautions/Restrictions other (see comments);fall  ab incisions  -     Barriers to Rehab none identified  -       Row Name 03/21/24 1409          Living Environment    People in Home alone  -       Row Name 03/21/24 1409          Home Main Entrance    Number of Stairs, Main Entrance two  -     Stair Railings, Main Entrance none  -       Row Name 03/21/24 1409          Stairs Within Home, Primary    Number of Stairs, Within Home, Primary none  -       Row Name 03/21/24 1409          Cognition    Orientation Status (Cognition) oriented x 4  -       Row Name 03/21/24 1409          Safety Issues, Functional Mobility    Safety Issues Affecting Function (Mobility) safety precaution awareness  -     Impairments Affecting Function (Mobility) balance;endurance/activity tolerance;pain;strength  -               User Key  (r) = Recorded By, (t) = Taken By, (c) = Cosigned By      Initials Name Provider Type     Pam Merrill PT Physical Therapist                   Mobility       Row Name 03/21/24 1411          Bed Mobility    Bed Mobility supine-sit  -     Supine-Sit [place] (Bed Mobility)  contact guard  -HM     Assistive Device (Bed Mobility) head of bed elevated;bed rails  -HM     Comment, (Bed Mobility) no symptom onset sitting EOB, no physical assist needed  -HM       Row Name 03/21/24 1411          Sit-Stand Transfer    Sit-Stand [place] (Transfers) contact guard  -HM     Comment, (Sit-Stand Transfer) no AD  -HM       Row Name 03/21/24 1411          Gait/Stairs (Locomotion)    [place] Level (Gait) contact guard  -HM     Distance in Feet (Gait) 350  -HM     Pattern (Gait) step-through  -HM     Deviations/Abnormal Patterns (Gait) bilateral deviations;stride length decreased;gait speed decreased;milo decreased  -HM     Bilateral Gait Deviations --  increased lateral sway  -HM     Gait Assessment/Intervention Pt ambulated with a decreased gait speed, decreased stride length, increased lateral sway with fatigue, and decreased milo. Mobility limited d/t fatigue. Pt educated to ambulate with staff assist in hallwayto improve endurance.  -HM               User Key  (r) = Recorded By, (t) = Taken By, (c) = Cosigned By      Initials Name Provider Type     Pam Merrill, PT Physical Therapist                   Obj/Interventions       Row Name 03/21/24 1415          Range of Motion Comprehensive    General Range of Motion bilateral lower extremity ROM WFL  -HM       Row Name 03/21/24 1415          Strength Comprehensive (MMT)    General Manual Muscle Testing (MMT) Assessment lower extremity strength deficits identified  -HM     Comment, General Manual Muscle Testing (MMT) Assessment deferred formal MMT d/t pain, BLE grossly 4+/5  -HM       Row Name 03/21/24 1415          Balance    Balance Assessment sitting static balance;sitting dynamic balance;sit to stand dynamic balance;standing static balance;standing dynamic balance  -HM     Static Sitting Balance independent  -HM     Dynamic Sitting Balance standby assist  -HM     Position, Sitting Balance unsupported;sitting edge of bed  -HM      Sit to Stand Dynamic Balance contact guard  -HM     Static Standing Balance contact guard  -HM     Dynamic Standing Balance contact guard  -HM     Position/Device Used, Standing Balance unsupported  -HM     Balance Interventions standing;dynamic;occupation based/functional task  -HM     Comment, Balance no overt LOB  -       Row Name 03/21/24 1415          Sensory Assessment (Somatosensory)    Sensory Assessment (Somatosensory) LE sensation intact  -               User Key  (r) = Recorded By, (t) = Taken By, (c) = Cosigned By      Initials Name Provider Type     Pam Merrill, PT Physical Therapist                   Goals/Plan       Row Name 03/21/24 1421          Bed Mobility Goal 1 (PT)    Activity/Assistive Device (Bed Mobility Goal 1, PT) bed mobility activities, all  -HM     [place] Level/Cues Needed (Bed Mobility Goal 1, PT) independent  -HM     Time Frame (Bed Mobility Goal 1, PT) long term goal (LTG);10 days  -HM     Progress/Outcomes (Bed Mobility Goal 1, PT) new goal  -       Row Name 03/21/24 1421          Transfer Goal 1 (PT)    Activity/Assistive Device (Transfer Goal 1, PT) sit-to-stand/stand-to-sit;bed-to-chair/chair-to-bed  -HM     [place] Level/Cues Needed (Transfer Goal 1, PT) independent  -HM     Time Frame (Transfer Goal 1, PT) long term goal (LTG);10 days  -HM     Progress/Outcome (Transfer Goal 1, PT) new goal  -       Row Name 03/21/24 1421          Gait Training Goal 1 (PT)    Activity/Assistive Device (Gait Training Goal 1, PT) gait (walking locomotion)  -HM     [place] Level (Gait Training Goal 1, PT) independent  -HM     Distance (Gait Training Goal 1, PT) 500  -HM     Time Frame (Gait Training Goal 1, PT) long term goal (LTG);10 days  -HM     Progress/Outcome (Gait Training Goal 1, PT) new goal  -       Row Name 03/21/24 1421          Stairs Goal 1 (PT)    Activity/Assistive Device (Stairs Goal 1, PT) ascending stairs;descending stairs  -     [place]  Level/Cues Needed (Stairs Goal 1, PT) standby assist  -     Number of Stairs (Stairs Goal 1, PT) 2  -HM     Time Frame (Stairs Goal 1, PT) long term goal (LTG);10 days  -     Progress/Outcome (Stairs Goal 1, PT) new goal  -       Row Name 03/21/24 1421          Therapy Assessment/Plan (PT)    Planned Therapy Interventions (PT) balance training;bed mobility training;gait training;home exercise program;neuromuscular re-education;postural re-education;patient/family education;ROM (range of motion);stair training;strengthening;stretching;transfer training  -               User Key  (r) = Recorded By, (t) = Taken By, (c) = Cosigned By      Initials Name Provider Type     Pam Merrill, PT Physical Therapist                   Clinical Impression       Row Name 03/21/24 1417          Pain    Pretreatment Pain Rating 5/10  -     Posttreatment Pain Rating 5/10  -     Pain Location incisional  -     Pain Location - abdomen  -     Pre/Posttreatment Pain Comment tolerated, educated on splinting for pain  -     Pain Intervention(s) Repositioned;Ambulation/increased activity;Splinting  -       Row Name 03/21/24 1417          Plan of Care Review    Plan of Care Reviewed With patient  -     Progress no change  -     Outcome Evaluation PT eval completed. Pt ambulated 350 feet CGA with no AD with fatigue limiting further mobility. Pt educated on splinting for pain. Pt demonstrated mobility below baseline function with generalized weakness, decreased functional endurance, and mild balance deficits warranting IPPT POC. d/c rec home with assist when medically appropriate.  -       Row Name 03/21/24 1417          Therapy Assessment/Plan (PT)    Rehab Potential (PT) good, to achieve stated therapy goals  -     Criteria for Skilled Interventions Met (PT) yes;meets criteria;skilled treatment is necessary  -     Therapy Frequency (PT) daily  -       Row Name 03/21/24 1417          Vital Signs    Pre  Systolic BP Rehab 119  -HM     Pre Treatment Diastolic BP 58  -HM     Post Systolic BP Rehab 135  -HM     Post Treatment Diastolic BP 68  -HM     Pretreatment Heart Rate (beats/min) 71  -HM     Posttreatment Heart Rate (beats/min) 81  -HM     Pre SpO2 (%) 100  -HM     O2 Delivery Pre Treatment room air  -HM     O2 Delivery Intra Treatment room air  -HM     Post SpO2 (%) 99  -HM     O2 Delivery Post Treatment room air  -HM     Pre Patient Position Supine  -HM     Intra Patient Position Standing  -HM     Post Patient Position Sitting  -HM       Row Name 03/21/24 1417          Positioning and Restraints    Pre-Treatment Position in bed  -HM     Post Treatment Position chair  -HM     In Chair notified nsg;encouraged to call for assist;reclined;sitting;call light within reach;legs elevated  RN deferred exit alarm  -               User Key  (r) = Recorded By, (t) = Taken By, (c) = Cosigned By      Initials Name Provider Type    Pam Rodriguez, PT Physical Therapist                   Outcome Measures       Row Name 03/21/24 1422 03/21/24 0800       How much help from another person do you currently need...    Turning from your back to your side while in flat bed without using bedrails? 4  - 4  -LN    Moving from lying on back to sitting on the side of a flat bed without bedrails? 3  - 4  -LN    Moving to and from a bed to a chair (including a wheelchair)? 3  - 3  -LN    Standing up from a chair using your arms (e.g., wheelchair, bedside chair)? 3  - 3  -LN    Climbing 3-5 steps with a railing? 3  - 3  -LN    To walk in hospital room? 3  - 3  -LN    AM-PAC 6 Clicks Score (PT) 19  -HM 20  -LN    Highest Level of Mobility Goal 6 --> Walk 10 steps or more  - 6 --> Walk 10 steps or more  -LN      Row Name 03/21/24 1422          Functional Assessment    Outcome Measure Options AM-PAC 6 Clicks Basic Mobility (PT)  -               User Key  (r) = Recorded By, (t) = Taken By, (c) = Cosigned By      Initials  Name Provider Type    Deyanira Abbott, RN Registered Nurse     Pam Merrill, JOLYNN Physical Therapist                                 Physical Therapy Education       Title: PT OT SLP Therapies (In Progress)       Topic: Physical Therapy (In Progress)       Point: Mobility training (Done)       Learning Progress Summary             Patient Acceptance, E,TB, VU,NR by  at 3/21/2024 1422                         Point: Home exercise program (Not Started)       Learner Progress:  Not documented in this visit.              Point: Body mechanics (Done)       Learning Progress Summary             Patient Acceptance, E,TB, VU,NR by  at 3/21/2024 1422                         Point: Precautions (Done)       Learning Progress Summary             Patient Acceptance, E,TB, VU,NR by  at 3/21/2024 1422                                         User Key       Initials Effective Dates Name Provider Type Discipline     09/22/22 -  Pam Merrill PT Physical Therapist PT                  PT Recommendation and Plan  Planned Therapy Interventions (PT): balance training, bed mobility training, gait training, home exercise program, neuromuscular re-education, postural re-education, patient/family education, ROM (range of motion), stair training, strengthening, stretching, transfer training  Plan of Care Reviewed With: patient  Progress: no change  Outcome Evaluation: PT eval completed. Pt ambulated 350 feet CGA with no AD with fatigue limiting further mobility. Pt educated on splinting for pain. Pt demonstrated mobility below baseline function with generalized weakness, decreased functional endurance, and mild balance deficits warranting IPPT POC. d/c rec home with assist when medically appropriate.     Time Calculation:   PT Evaluation Complexity  History, PT Evaluation Complexity: 3 or more personal factors and/or comorbidities  Examination of Body Systems (PT Eval Complexity): total of 3 or more elements  Clinical  Presentation (PT Evaluation Complexity): stable  Clinical Decision Making (PT Evaluation Complexity): low complexity  Overall Complexity (PT Evaluation Complexity): low complexity     PT Charges       Row Name 03/21/24 1423             Time Calculation    Start Time 1310  -HM      PT Received On 03/21/24  -HM      PT Goal Re-Cert Due Date 03/31/24  -HM         Untimed Charges    PT Eval/Re-eval Minutes 46  -HM         Total Minutes    Untimed Charges Total Minutes 46  -HM       Total Minutes 46  -HM                User Key  (r) = Recorded By, (t) = Taken By, (c) = Cosigned By      Initials Name Provider Type     Pam Merrill, PT Physical Therapist                  Therapy Charges for Today       Code Description Service Date Service Provider Modifiers Qty    82456795519 HC PT EVAL LOW COMPLEXITY 4 3/21/2024 Pam Merrill, PT GP 1            PT G-Codes  Outcome Measure Options: AM-PAC 6 Clicks Basic Mobility (PT)  AM-PAC 6 Clicks Score (PT): 19  PT Discharge Summary  Anticipated Discharge Disposition (PT): home with assist    Pam Merrill PT  3/21/2024

## 2024-03-21 NOTE — CASE MANAGEMENT/SOCIAL WORK
Discharge Planning Assessment  [facility]     Patient Name: Maryana Freeman  MRN: [id number]  Today's Date: 3/21/2024    Admit Date: 3/20/2024    Plan: HOME   Discharge Needs Assessment       Row Name 03/21/24 1542       Living Environment    People in Home alone    Primary Care Provided by self    Provides Primary Care For no one    Family Caregiver if Needed child(daniel), adult    Quality of Family Relationships helpful;involved;supportive    Able to Return to Prior Arrangements yes       Transition Planning    Patient/Family Anticipates Transition to home    Patient/Family Anticipated Services at Transition     Transportation Anticipated family or friend will provide       Discharge Needs Assessment    Readmission Within the Last 30 Days no previous admission in last 30 days    Equipment Currently Used at Home none    Concerns to be Addressed no discharge needs identified;denies needs/concerns at this time;discharge planning    Anticipated Changes Related to Illness none    Equipment Needed After Discharge none    Current Discharge Risk chronically ill;lives alone                   Discharge Plan       Row Name 03/21/24 1542       Plan    Plan HOME    Patient/Family in Agreement with Plan yes    Plan Comments Met with pt and daughter at bedside for DCP.  Pt resides alone in [facility].  She is independent with ADLs.  No current DME or HH services.  Confirmed she has Medicare and [employer] insurance with Rx coverage.  Goal is to return home upon DC.  No immediate needs identified/voiced.  CM will cont to follow.    Final Discharge Disposition Code 01 - home or self-care                  Continued Care and Services - Admitted Since 3/20/2024    No active coordination exists for this encounter.          Demographic Summary       Row Name 03/21/24 1541       General Information    Admission Type inpatient    Referral Source admission list    Reason for Consult discharge planning    Preferred Language English        Contact Information    Permission Granted to Share Info With     Contact Information Obtained for                    Functional Status       Row Name 03/21/24 1542       Functional Status    Usual Activity Tolerance good    Current Activity Tolerance good       Functional Status, IADL    Medications independent    Meal Preparation independent    Housekeeping independent    Laundry independent    Shopping independent                   Psychosocial    No documentation.                  Abuse/Neglect    No documentation.                  Legal    No documentation.                  Substance Abuse    No documentation.                  Patient Forms    No documentation.                     Ashley Galvez RN

## 2024-03-21 NOTE — ANESTHESIA POSTPROCEDURE EVALUATION
Patient: Maryana Freeman    Procedure Summary       Date: 03/20/24 Room / Location:  [facility] / [facility]    Anesthesia Start: 1813 Anesthesia Stop: 2015    Procedures:       DIAGNOSTIC LAPAROSCOPY (Abdomen)      CYSTOSCOPY (Urethra) Diagnosis:       S/P hysterectomy      Postoperative anemia due to acute blood loss      (S/P hysterectomy [Z90.710])      (Postoperative anemia due to acute blood loss [D62])    Surgeons: Zayra Chong MD Provider: Alex Fernandez MD    Anesthesia Type: general ASA Status: 3 - Emergent            Anesthesia Type: general    Vitals  Vitals Value Taken Time   BP     Temp     Pulse 97 03/20/24 2015   Resp     SpO2 100 % 03/20/24 2015   Vitals shown include unfiled device data.        Post Anesthesia Care and Evaluation    Patient location during evaluation: PACU  Patient participation: complete - patient participated  Level of consciousness: awake and alert  Pain management: adequate    Airway patency: patent  Anesthetic complications: No anesthetic complications  PONV Status: none  Cardiovascular status: hemodynamically stable and acceptable  Respiratory status: nonlabored ventilation, acceptable and nasal cannula  Hydration status: acceptable

## 2024-03-21 NOTE — PLAN OF CARE
Goal Outcome Evaluation:  Plan of Care Reviewed With: patient        Progress: no change  Outcome Evaluation: PT eval completed. Pt ambulated 350 feet CGA with no AD with fatigue limiting further mobility. Pt educated on splinting for pain. Pt demonstrated mobility below baseline function with generalized weakness, decreased functional endurance, and mild balance deficits warranting IPPT POC. d/c rec home with assist when medically appropriate.      Anticipated Discharge Disposition (PT): home with assist

## 2024-03-22 LAB
BACTERIA UR QL AUTO: ABNORMAL /HPF
BILIRUB UR QL STRIP: NEGATIVE
CLARITY UR: CLEAR
COLOR UR: YELLOW
DEPRECATED RDW RBC AUTO: 52.4 FL (ref 37–54)
ERYTHROCYTE [DISTWIDTH] IN BLOOD BY AUTOMATED COUNT: 17.2 % (ref 12.3–15.4)
GLUCOSE UR STRIP-MCNC: NEGATIVE MG/DL
HCT VFR BLD AUTO: 26.5 % (ref 34–46.6)
HGB BLD-MCNC: 8.8 G/DL (ref 12–15.9)
HGB UR QL STRIP.AUTO: ABNORMAL
HYALINE CASTS UR QL AUTO: ABNORMAL /LPF
KETONES UR QL STRIP: NEGATIVE
LEUKOCYTE ESTERASE UR QL STRIP.AUTO: ABNORMAL
MCH RBC QN AUTO: 28.4 PG (ref 26.6–33)
MCHC RBC AUTO-ENTMCNC: 33.2 G/DL (ref 31.5–35.7)
MCV RBC AUTO: 85.5 FL (ref 79–97)
NITRITE UR QL STRIP: NEGATIVE
PH UR STRIP.AUTO: 5.5 [PH] (ref 5–8)
PLATELET # BLD AUTO: 209 10*3/MM3 (ref 140–450)
PMV BLD AUTO: 9.1 FL (ref 6–12)
PROT UR QL STRIP: NEGATIVE
RBC # BLD AUTO: 3.1 10*6/MM3 (ref 3.77–5.28)
RBC # UR STRIP: ABNORMAL /HPF
REF LAB TEST METHOD: ABNORMAL
SP GR UR STRIP: 1.01 (ref 1–1.03)
SQUAMOUS #/AREA URNS HPF: ABNORMAL /HPF
UROBILINOGEN UR QL STRIP: ABNORMAL
WBC # UR STRIP: ABNORMAL /HPF
WBC NRBC COR # BLD AUTO: 6.79 10*3/MM3 (ref 3.4–10.8)

## 2024-03-22 PROCEDURE — 97116 GAIT TRAINING THERAPY: CPT

## 2024-03-22 PROCEDURE — 81001 URINALYSIS AUTO W/SCOPE: CPT | Performed by: INTERNAL MEDICINE

## 2024-03-22 PROCEDURE — 85027 COMPLETE CBC AUTOMATED: CPT | Performed by: OBSTETRICS & GYNECOLOGY

## 2024-03-22 PROCEDURE — 97530 THERAPEUTIC ACTIVITIES: CPT

## 2024-03-22 RX ADMIN — CARVEDILOL 25 MG: 12.5 TABLET, FILM COATED ORAL at 17:10

## 2024-03-22 RX ADMIN — TERAZOSIN HYDROCHLORIDE 1 MG: 1 CAPSULE ORAL at 20:08

## 2024-03-22 RX ADMIN — ACETAMINOPHEN 650 MG: 325 TABLET ORAL at 09:12

## 2024-03-22 RX ADMIN — PANTOPRAZOLE SODIUM 40 MG: 40 INJECTION, POWDER, FOR SOLUTION INTRAVENOUS at 20:08

## 2024-03-22 RX ADMIN — CARVEDILOL 25 MG: 12.5 TABLET, FILM COATED ORAL at 09:12

## 2024-03-22 RX ADMIN — GABAPENTIN 100 MG: 100 CAPSULE ORAL at 15:50

## 2024-03-22 RX ADMIN — PANTOPRAZOLE SODIUM 40 MG: 40 INJECTION, POWDER, FOR SOLUTION INTRAVENOUS at 09:12

## 2024-03-22 RX ADMIN — LOSARTAN POTASSIUM 100 MG: 50 TABLET, FILM COATED ORAL at 09:12

## 2024-03-22 RX ADMIN — GABAPENTIN 100 MG: 100 CAPSULE ORAL at 09:12

## 2024-03-22 RX ADMIN — Medication 10 ML: at 09:12

## 2024-03-22 NOTE — PROGRESS NOTES
Maryana Vaishnavi Freeman  03/22/24  [id number]      S:  Patient complains of mild abdominal pain and lower back discomfort.  Overall feels better today than yesterday.  Tolerating regular diet.  No bm since second surgery, but passing gas.  She denies chest pain or soa.    O:  Vitals reviewed and are stable in WNL  CBC :3/21/24  reviewed    NAD  Ab:  soft  Skin:  ecchymosis around umbilical incision and especially on left side.  Patient reports bruising present after initial surgery 1 week ago.    :  no VB    A/P:  POD 2 s/p return to OR for postoperative bleeding in a complicated patient who was on therapeutic heparin for history of a.fib.  She had large intraabdominal bleeding that was treated surgically.  No active bleeding noted.  She had 1 unit blood transfusion yesterday.  Repeat cbc 2 hours after transfusion showed an appropriate rise in hb.  Repeat CBC today.    Medical issues being managed by Dr. ASHLEY caceres.      Continue to observe in house today for signs of postoperative bleeding.  Continue holding heparin per plan by original care team.      Irene Prabhakar MD  03/22/24

## 2024-03-22 NOTE — THERAPY TREATMENT NOTE
Patient Name: Maryana Freeman  : 1955    MRN: [id number]                              Today's Date: 3/22/2024       Admit Date: 3/20/2024    Visit Dx:     ICD-10-CM ICD-9-CM   1. Postoperative anemia due to acute blood loss  D62 285.1   2. Acute abdominal pain  R10.9 789.00     338.19   3. Anemia, unspecified type  D64.9 285.9   4. Free fluid in pelvis  R18.8 789.59   5. Post-op pain  G89.18 338.18   6. Referred by health care professional  Z02.89 V68.89   7. S/P  robotic assisted hysterectomy with bilateral salpingo-oophorectomy, lysis of adhesions, and anterior repair, cystoscopy  Z90.710 V88.01     Patient Active Problem List   Diagnosis    Atrial fibrillation, chronic    Non-ischemic cardiomyopathy    Essential hypertension    Uterine prolapse    Chronic anticoagulation    ABLA (acute blood loss anemia)    Postoperative vaginal bleeding following genitourinary procedure    S/P laparoscopy with evacuation of peritoneal hematoma. 3/20/23.     Past Medical History:   Diagnosis Date    Abnormal ECG     Atrial fibrillation     Diverticulosis     Female bladder prolapse     PONV (postoperative nausea and vomiting)     Uterine prolapse      Past Surgical History:   Procedure Laterality Date    ANTERIOR VAGINAL REPAIR N/A 3/13/2024    Procedure: ANTERIOR AND POSTERIOR REPAIR;  Surgeon: Zayra Chong MD;  Location: [facility] OR;  Service: Gynecology;  Laterality: N/A;    CARDIOVERSION      COLONOSCOPY      x3    CYSTOSCOPY N/A 3/13/2024    Procedure: CYSTOSCOPY;  Surgeon: Zayra Chong MD;  Location:  [facility];  Service: Robotics - Da[employer]inci;  Laterality: N/A;    CYSTOSCOPY N/A 3/20/2024    Procedure: CYSTOSCOPY;  Surgeon: Zayra Chong MD;  Location:  [facility];  Service: Gynecology;  Laterality: N/A;    DIAGNOSTIC LAPAROSCOPY N/A 3/20/2024    Procedure: OPERATIVE LAPAROSCOPY EVACUCATION OF HEMOPERITONEUM;  Surgeon: Zayra Chong MD;  Location:  [facility];  Service:  Gynecology;  Laterality: N/A;    HIP SURGERY Bilateral     bilateral hip arthroplasty    KNEE ARTHROPLASTY Right     TOTAL LAPAROSCOPIC HYSTERECTOMY VAGINAL VAULT SUSPENSION N/A 3/13/2024    Procedure: ROBOTIC ASSISTED HYSTERECTOMY WITH BILATERAL SALPINGOOPHORECTOMY, UTEROSACRAL LIGAMENT VAGINAL VAULT SUSPENSION;  Surgeon: Zayra Chong MD;  [facility];  Service: Robotics - DaVinci;  Laterality: N/A;      General Information       Row Name 03/22/24 1327          Physical Therapy Time and Intention    Document Type therapy note (daily note)  -     Mode of Treatment physical therapy  -       Row Name 03/22/24 1327          General Information    Patient Profile Reviewed yes  -     Existing Precautions/Restrictions fall;other (see comments)  abdominal incisions  -     Barriers to Rehab none identified  -       Row Name 03/22/24 1327          Cognition    Orientation Status (Cognition) oriented x 3  -       Row Name 03/22/24 1327          Safety Issues, Functional Mobility    Safety Issues Affecting Function (Mobility) safety precaution awareness  -     Impairments Affecting Function (Mobility) balance;endurance/activity tolerance;pain;strength;postural/trunk control;shortness of breath;coordination  -               User Key  (r) = Recorded By, (t) = Taken By, (c) = Cosigned By      Initials Name Provider Type     Erika Schulte, JOLYNN Physical Therapist                   Mobility       Row Name 03/22/24 1328          Bed Mobility    Bed Mobility rolling left;rolling right;sidelying-sit;sit-sidelying  -     Rolling Left Reserve (Bed Mobility) standby assist;verbal cues;nonverbal cues (demo/gesture)  -     Rolling Right Reserve (Bed Mobility) contact guard;standby assist;verbal cues;nonverbal cues (demo/gesture)  -     Sidelying-Sit Reserve (Bed Mobility) contact guard;standby assist;verbal cues;nonverbal cues (demo/gesture)  -     Sit-Sidelying Reserve  (Bed Mobility) contact guard;standby assist;verbal cues;nonverbal cues (demo/gesture)  -BA     Assistive Device (Bed Mobility) bed rails  -BA     Comment, (Bed Mobility) Received sitting EOB upon arrival.  Edu pt on log rolling technique for bed mobility.  HOB flat to simulate home environment.  Performed rolling and sidelying to sit x2 reps for practice and promoted indep with CGA progressing to SBA.  VCs/TCs for technique, sequencing, and hand placement.  -BA       Row Name 03/22/24 1328          Sit-Stand Transfer    Sit-Stand [place] (Transfers) standby assist  -BA     Comment, (Sit-Stand Transfer) STS x 2 reps from EOB.  -BA       Row Name 03/22/24 1328          Gait/Stairs (Locomotion)    Gait/Stairs Locomotion gait/ambulation independence;distance ambulated;gait pattern;gait deviations  -BA     [place] Level (Gait) contact guard;verbal cues  -BA     Distance in Feet (Gait) 720  -BA     Pattern (Gait) step-through  -BA     Deviations/Abnormal Patterns (Gait) bilateral deviations;milo decreased;gait speed decreased;stride length decreased;other (see comments)  lateral postural sway  -BA     Bilateral Gait Deviations heel strike decreased  -BA     Gait Assessment/Intervention Mild lateral postural swaying.  Exhibited 1 mild LOB that pt was able to self-correct by reaching out with an UE on wall to brace self for added support.  Reported mild SOA/MEZA with increasead ambulation distance.  VCs for improved stride length and improved heel strike upon IC.  Gait distance limited by fatigue.  -BA     Negotiation (Stairs) stairs independence;handrail location;number of steps;ascending technique;descending technique  -BA     [place] Level (Stairs) contact guard;verbal cues  -BA     Handrail Location (Stairs) right side (ascending);right side (descending)  -BA     Number of Steps (Stairs) 2  -BA     Ascending Technique (Stairs) step-to-step  -BA     Descending Technique (Stairs) step-over-step  -BA      Stairs, Impairments strength decreased  -     Stairs Assessment/Intervention VCs for technique, sequencing, hand placement, and pacing.  -BA               User Key  (r) = Recorded By, (t) = Taken By, (c) = Cosigned By      Initials Name Provider Type    Erika Rojas, JOLYNN Physical Therapist                   Obj/Interventions       Row Name 03/22/24 1448          Balance    Balance Assessment sitting static balance;sitting dynamic balance;sit to stand dynamic balance;standing static balance;standing dynamic balance  -     Static Sitting Balance independent  -     Dynamic Sitting Balance supervision  -     Position, Sitting Balance unsupported;sitting edge of bed;sitting in chair  -     Sit to Stand Dynamic Balance standby assist  -     Static Standing Balance standby assist  -     Dynamic Standing Balance contact guard;verbal cues  -     Position/Device Used, Standing Balance unsupported  -     Comment, Balance Intermittent mild instability with ambulation with no AD.  Exhibited 1 mild LOB that pt was able to self-correct by reaching out with an UE for added support to correct imbalance.  Fairly adequate balance with stair navigation with use of handrail; no overt LOB noted.  -BA               User Key  (r) = Recorded By, (t) = Taken By, (c) = Cosigned By      Initials Name Provider Type    Erika Rojas, PT Physical Therapist                   Goals/Plan    No documentation.                  Clinical Impression       Row Name 03/22/24 1448          Pain    Pretreatment Pain Rating 2/10  -BA     Posttreatment Pain Rating 2/10  -     Pain Location - Side/Orientation Right  -     Pain Location incisional  -     Pain Location - abdomen  -     Pre/Posttreatment Pain Comment Tolerated activity; RN aware and managing.  -     Pain Intervention(s) Ambulation/increased activity;Repositioned;Rest  -       Row Name 03/22/24 1441          Plan of Care Review    Plan of Care Reviewed  With patient;daughter  -BA     Progress improving  -BA     Outcome Evaluation Improved level of assist with STS with SBA.  Increased ambulation distance to 720ft with CGA and no AD; exhibited 1 mild LOB that pt was able to self-correct.  Progressed to stair navigation with ascending/descending 2 steps with CGA and use of handrail.  Edu on log rolling technique for bed mobility with good performance.  Con to present below baseline with mild gait instability, decreased balance, decreased functional endurance, weakness, and pain limiting indep with mobility.  Will con to benefit from skilled IP PT to improve deficits and promote return to PLOF.  Rec HWA upon d/c.  -BA       Row Name 03/22/24 1447          Vital Signs    Pre Systolic BP Rehab 148  -BA     Pre Treatment Diastolic BP 78  -BA     Post Systolic BP Rehab 157  -BA     Post Treatment Diastolic BP 81  -BA     Pretreatment Heart Rate (beats/min) 84  -BA     Intratreatment Heart Rate (beats/min) 116  -BA     Posttreatment Heart Rate (beats/min) 78  -BA     Pre SpO2 (%) 100  -BA     O2 Delivery Pre Treatment room air  -BA     O2 Delivery Intra Treatment room air  -BA     Post SpO2 (%) 99  -BA     O2 Delivery Post Treatment room air  -BA     Pre Patient Position Sitting  -BA     Intra Patient Position Standing  -BA     Post Patient Position Sitting  -BA       Row Name 03/22/24 1447          Positioning and Restraints    Pre-Treatment Position in bed  -BA     Post Treatment Position chair  -BA     In Chair notified nsg;reclined;sitting;call light within reach;encouraged to call for assist;with family/caregiver;waffle cushion;legs elevated  RN deferred chair alarm.  -BA               User Key  (r) = Recorded By, (t) = Taken By, (c) = Cosigned By      Initials Name Provider Type    Erika Rojas, PT Physical Therapist                   Outcome Measures       Row Name 03/22/24 1453 03/22/24 0800       How much help from another person do you currently need...     Turning from your back to your side while in flat bed without using bedrails? 3  -BA 4  -GUIDO    Moving from lying on back to sitting on the side of a flat bed without bedrails? 3  -BA 3  -GUIDO    Moving to and from a bed to a chair (including a wheelchair)? 3  -BA 3  -GUIDO    Standing up from a chair using your arms (e.g., wheelchair, bedside chair)? 3  -BA 3  -GUIDO    Climbing 3-5 steps with a railing? 3  -BA 3  -GUIDO    To walk in hospital room? 3  -BA 3  -GUIDO    AM-PAC 6 Clicks Score (PT) 18  -BA 19  -GUIDO    Highest Level of Mobility Goal 6 --> Walk 10 steps or more  -BA 6 --> Walk 10 steps or more  -GUIDO      Row Name 03/22/24 1453          Functional Assessment    Outcome Measure Options AM-PAC 6 Clicks Basic Mobility (PT)  -               User Key  (r) = Recorded By, (t) = Taken By, (c) = Cosigned By      Initials Name Provider Type    Donte Mckenzie RN Registered Nurse     Erika Schulte, JOLYNN Physical Therapist                                 Physical Therapy Education       Title: PT OT SLP Therapies (In Progress)       Topic: Physical Therapy (In Progress)       Point: Mobility training (Done)       Learning Progress Summary             Patient Acceptance, E, VU,NR by  at 3/22/2024 1454    Acceptance, E,TB, VU,NR by  at 3/21/2024 1422                         Point: Home exercise program (Not Started)       Learner Progress:  Not documented in this visit.              Point: Body mechanics (Done)       Learning Progress Summary             Patient Acceptance, E, VU,NR by  at 3/22/2024 1454    Acceptance, E,TB, VU,NR by  at 3/21/2024 1422                         Point: Precautions (Done)       Learning Progress Summary             Patient Acceptance, E, VU,NR by  at 3/22/2024 1454    Acceptance, E,TB, VU,NR by  at 3/21/2024 1422                                         User Key       Initials Effective Dates Name Provider Type [facility] 09/21/21 -  Erika Schulte, JOLYNN Physical  Therapist PT     09/22/22 -  Pam Merrill PT Physical Therapist PT                  PT Recommendation and Plan     Plan of Care Reviewed With: patient, daughter  Progress: improving  Outcome Evaluation: Improved level of assist with STS with SBA.  Increased ambulation distance to 720ft with CGA and no AD; exhibited 1 mild LOB that pt was able to self-correct.  Progressed to stair navigation with ascending/descending 2 steps with CGA and use of handrail.  Edu on log rolling technique for bed mobility with good performance.  Con to present below baseline with mild gait instability, decreased balance, decreased functional endurance, weakness, and pain limiting indep with mobility.  Will con to benefit from skilled IP PT to improve deficits and promote return to PLOF.  Rec HWA upon d/c.     Time Calculation:         PT Charges       Row Name 03/22/24 1454             Time Calculation    Start Time 1030  -BA      PT Received On 03/22/24  -BA         Time Calculation- PT    Total Timed Code Minutes- PT 26 minute(s)  -BA         Timed Charges    87466 - Gait Training Minutes  14  -BA      26731 - PT Therapeutic Activity Minutes 12  -BA         Total Minutes    Timed Charges Total Minutes 26  -BA       Total Minutes 26  -BA                User Key  (r) = Recorded By, (t) = Taken By, (c) = Cosigned By      Initials Name Provider Type    BA Erika Schulte, PT Physical Therapist                  Therapy Charges for Today       Code Description Service Date Service Provider Modifiers Qty    30764650417 HC GAIT TRAINING EA 15 MIN 3/22/2024 Erika Schulte, PT GP 1    11385637606 HC PT THERAPEUTIC ACT EA 15 MIN 3/22/2024 Erika Schulte, PT GP 1            PT G-Codes  Outcome Measure Options: AM-PAC 6 Clicks Basic Mobility (PT)  AM-PAC 6 Clicks Score (PT): 18  PT Discharge Summary  Anticipated Discharge Disposition (PT): home with assist    Erika Schulte PT  3/22/2024

## 2024-03-22 NOTE — PLAN OF CARE
Goal Outcome Evaluation:  Plan of Care Reviewed With: patient, daughter        Progress: improving  Outcome Evaluation: Improved level of assist with STS with SBA.  Increased ambulation distance to 720ft with CGA and no AD; exhibited 1 mild LOB that pt was able to self-correct.  Progressed to stair navigation with ascending/descending 2 steps with CGA and use of handrail.  Edu on log rolling technique for bed mobility with good performance.  Con to present below baseline with mild gait instability, decreased balance, decreased functional endurance, weakness, and pain limiting indep with mobility.  Will con to benefit from skilled IP PT to improve deficits and promote return to PLOF.  Rec HWA upon d/c.      Anticipated Discharge Disposition (PT): home with assist

## 2024-03-22 NOTE — PROGRESS NOTES
"IM progress note      Maryana Freeman  [id number]  1955     LOS: 2 days     Attending: Zayra Chong,*    Primary Care Provider: Astrid Smalls MD      Chief Complaint/Reason for visit:    Chief Complaint   Patient presents with    Post-op Problem       Subjective   Continues to do well.  Tolerating p.o. diet.  Passed flatus but no BM.  Responded well to packed red blood transfusion yesterday.  No nausea or vomiting.  No shortness of breath.    Objective      Visit Vitals  /81 (BP Location: Right arm, Patient Position: Sitting)   Pulse 78   Temp 98.5 °F (36.9 °C) (Oral)   Resp 18   Ht 162.6 cm (64\")   Wt 101 kg (223 lb 6.4 oz)   SpO2 99%   BMI 38.35 kg/m²     Temp (24hrs), Av.6 °F (37 °C), Min:98.3 °F (36.8 °C), Max:99.2 °F (37.3 °C)      Intake/Output:    Intake/Output Summary (Last 24 hours) at 3/22/2024 1216  Last data filed at 3/22/2024 1043  Gross per 24 hour   Intake 240 ml   Output 3000 ml   Net -2760 ml          Physical Exam:     General Appearance:    Alert, cooperative, in no acute distress   Head:    Normocephalic, without obvious abnormality, atraumatic    Lungs:     Normal effort, symmetric chest rise,  clear to  auscultation bilaterally                 Heart:    Irregular rhythm and normal rate, normal S1 and S2   Abdomen:     Soft, clean incisions. Few ecchymoses. Benign.    Extremities:   No clubbing, cyanosis or edema.  No deformities.    Pulses:   Pulses palpable and equal bilaterally   Skin:   No bleeding, bruising or rash          Results Review:     I reviewed the patient's new clinical results.   Results from last 7 days   Lab Units 24  0907 24  1212 24  0616 24  2216   WBC 10*3/mm3 6.79  --  5.90 6.45   HEMOGLOBIN g/dL 8.8* 8.2* 7.4* 8.6*   HEMATOCRIT % 26.5* 25.5* 22.6* 25.6*   PLATELETS 10*3/mm3 209  --  199 153     Results from last 7 days   Lab Units 24  0616 24  2216 24  0808   SODIUM mmol/L 132* 133* 131* "   POTASSIUM mmol/L 4.4 4.3 4.2   CHLORIDE mmol/L 101 102 97*   CO2 mmol/L 22.0 18.0* 23.0   BUN mg/dL 15 15 14   CREATININE mg/dL 0.75 0.99 1.20*   CALCIUM mg/dL 8.4* 8.1* 8.8   BILIRUBIN mg/dL  --   --  0.7   ALK PHOS U/L  --   --  51   ALT (SGPT) U/L  --   --  28   AST (SGOT) U/L  --   --  26   GLUCOSE mg/dL 120* 117* 151*     I reviewed the patient's new imaging including images and reports.    All medications reviewed.   carvedilol, 25 mg, Oral, BID With Meals  gabapentin, 100 mg, Oral, TID  losartan, 100 mg, Oral, Daily  pantoprazole, 40 mg, Intravenous, Q12H  terazosin, 1 mg, Oral, Nightly        Assessment & Plan       S/P laparoscopy with evacuation of peritoneal hematoma. 3/20/23.    Atrial fibrillation, chronic    Essential hypertension    Chronic anticoagulation    ABLA (acute blood loss anemia)    Postoperative vaginal bleeding following genitourinary procedure    AKF, resolved.      Plan  1. Ambulation, encouraged. PT requested.  2. Pain control-prns   3. IS-encouraged  4. DVT proph-Mechanicals.   5. Bowel regimen  6. Diet, ADAT.  7. Monitor post-op labs  8. DC planning.    - Hypertension: Monitoring  Resumed home medications as appropriate, formulary substitution when indicated. Resumed Losartan and Terazosin.  Holding parameters.  Prn medications for elevated blood pressure.      -Chronic A-fib and chronic anticoagulation  Resumed Coreg.  Will remain off of anticoagulation for total of 7 days.  Will coordinate with patient and her daughter as well as Dr. Chong to have a lab check at 7 days before resuming Xarelto at that point  I discussed that with patient's cardiologist Dr. Rodgers who is in agreement.    Discharge planning, possibly home tomorrow.    Dragon disclaimer:  Part of this encounter note is an electronic transcription/translation of spoken language to printed text. The electronic translation of spoken language may permit erroneous, or at times, nonsensical words or phrases to be  inadvertently transcribed; Although I have reviewed the note for such errors, some may still exist.    Hayder Perez MD  03/22/24  12:16 EDT

## 2024-03-23 ENCOUNTER — READMISSION MANAGEMENT (OUTPATIENT)
Dept: CALL CENTER | Facility: HOSPITAL | Age: 69
End: 2024-03-23
Payer: MEDICARE

## 2024-03-23 VITALS
RESPIRATION RATE: 18 BRPM | BODY MASS INDEX: 38.14 KG/M2 | WEIGHT: 223.4 LBS | OXYGEN SATURATION: 100 % | SYSTOLIC BLOOD PRESSURE: 108 MMHG | DIASTOLIC BLOOD PRESSURE: 96 MMHG | TEMPERATURE: 98 F | HEIGHT: 64 IN | HEART RATE: 80 BPM

## 2024-03-23 LAB
QT INTERVAL: 320 MS
QTC INTERVAL: 385 MS

## 2024-03-23 RX ADMIN — CARVEDILOL 25 MG: 12.5 TABLET, FILM COATED ORAL at 08:52

## 2024-03-23 RX ADMIN — LOSARTAN POTASSIUM 100 MG: 50 TABLET, FILM COATED ORAL at 08:52

## 2024-03-23 RX ADMIN — ACETAMINOPHEN 650 MG: 325 TABLET ORAL at 00:13

## 2024-03-23 RX ADMIN — PANTOPRAZOLE SODIUM 40 MG: 40 INJECTION, POWDER, FOR SOLUTION INTRAVENOUS at 08:51

## 2024-03-23 NOTE — DISCHARGE SUMMARY
Patient Name: Maryana Freeman  MRN: [id number]  : 1955  DOS: 3/23/2024    Attending: Zayra Chong,*    Primary Care Provider: Astrid Smalls MD    Date of Admission:.3/20/2024  7:32 AM    Date of Discharge:  3/23/2024    Discharge Diagnosis:   S/P laparoscopy with evacuation of peritoneal hematoma. 3/20/23.    Atrial fibrillation, chronic    Essential hypertension    Chronic anticoagulation    ABLA (acute blood loss anemia)    Postoperative vaginal bleeding following genitourinary procedure      Hospital Course  At admit    Ms. Freeman is a very pleasant 68-year-old female who is known to me from recent hospitalization to [facility] under the care of Dr. Chong with gynecology.     She underwent the following procedures, robotic assisted hysterectomy with bilateral salpingo-oophorectomy, lysis of adhesions, and anterior repair, cystoscopy, surgery was done on 3/13/2024, she did very well postoperatively, had her pain controlled, labs stable, tolerating p.o. diet, ambulated, and voided prior to being discharged home on postop day 1.     With her history of A-fib and chronic anticoagulation with Xarelto it was felt prudent to resume anticoagulants with therapeutic Lovenox instead of Xarelto initially.     Patient has been taking her Lovenox with the last injection being yesterday evening.  She has done well until about 3 days ago when she started feeling quite weak and nauseated.  She had diarrhea since then but no hematochezia no melena.  No hematemesis.     She contacted her surgeon who advised her to come to the ED.  Her eval in the emergency department showed her to be markedly anemic with hemoglobin of 7.5.  This is down from 11.6 at the day of discharge 6 days ago.     ED workup also included stool Hemoccult that was positive.  CT scan of the abdomen pelvis done, noted.  Free fluid observed.     When I saw her in the ED she is feeling weak with dyspnea with minimal  exertion.  Somewhat pale.  Blood pressure systolic 120s.  Heart rate around 100-110 resting.     No chest pain.  No angina equivalent.    After admit    She subsequently underwent laparoscopy with evacuation of peritoneal hematoma on 3/20/24     She was provided pain medication as needed for pain control.  She received DVT prophylaxis with mechanicals. Anticoagulation was held due to postoperative bleeding. She will remain off of anticoagulation for a total of 7 days post op. She will follow up with Dr. Chong outpatient for lab check at 7 days and if labs stable Dr. Chong will resume anticoagulation at that point. This was discussed with patient's cardiologist Dr. Rodgers and she is in agreement with this plan.     Patient was started on clear liquid diet, this was advanced. She showed evidence of bowel function return.      Tolerated diet without difficulty.    She used an IS for atelectasis prophylaxis.    Home medications were resumed as appropriate, and labs were monitored and remained fairly stable. No further signs of bleeding.   With the progress she has made, pt is ready for DC home today.      Discussed with patient regarding plan and she shows understanding and agreement.       Procedures Performed  Procedure(s):  OPERATIVE LAPAROSCOPY EVACUCATION OF HEMOPERITONEUM  CYSTOSCOPY       Pertinent Test Results:    I reviewed the patient's new clinical results.   Results from last 7 days   Lab Units 03/22/24  0907 03/21/24  1212 03/21/24  0616 03/20/24  2216   WBC 10*3/mm3 6.79  --  5.90 6.45   HEMOGLOBIN g/dL 8.8* 8.2* 7.4* 8.6*   HEMATOCRIT % 26.5* 25.5* 22.6* 25.6*   PLATELETS 10*3/mm3 209  --  199 153     Results from last 7 days   Lab Units 03/21/24  0616 03/20/24  2216 03/20/24  0808   SODIUM mmol/L 132* 133* 131*   POTASSIUM mmol/L 4.4 4.3 4.2   CHLORIDE mmol/L 101 102 97*   CO2 mmol/L 22.0 18.0* 23.0   BUN mg/dL 15 15 14   CREATININE mg/dL 0.75 0.99 1.20*   CALCIUM mg/dL 8.4* 8.1* 8.8  "  BILIRUBIN mg/dL  --   --  0.7   ALK PHOS U/L  --   --  51   ALT (SGPT) U/L  --   --  28   AST (SGOT) U/L  --   --  26   GLUCOSE mg/dL 120* 117* 151*     I reviewed the patient's new imaging including images and reports.          Physical therapy  Progress: improving  Outcome Evaluation: Improved level of assist with STS with SBA.  Increased ambulation distance to 720ft with CGA and no AD; exhibited 1 mild LOB that pt was able to self-correct.  Progressed to stair navigation with ascending/descending 2 steps with CGA and use of handrail.  Edu on log rolling technique for bed mobility with good performance.  Con to present below baseline with mild gait instability, decreased balance, decreased functional endurance, weakness, and pain limiting indep with mobility.  Will con to benefit from skilled IP PT to improve deficits and promote return to PLOF.  Rec HWA upon d/c.       Discharge Assessment:    Vital Signs  Visit Vitals  /96 (BP Location: Right arm, Patient Position: Sitting)   Pulse 80   Temp 98 °F (36.7 °C) (Axillary)   Resp 18   Ht 162.6 cm (64\")   Wt 101 kg (223 lb 6.4 oz)   SpO2 100%   BMI 38.35 kg/m²     Temp (24hrs), Av.5 °F (36.9 °C), Min:98 °F (36.7 °C), Max:99.1 °F (37.3 °C)      General Appearance:    Alert, cooperative, in no acute distress   Lungs:     Clear to auscultation,respirations regular, even and                   unlabored    Heart:    Regular rhythm and normal rate, normal S1 and S2    Abdomen:   Soft, clean incisions. Few ecchymoses. Benign.    Extremities:   Moves all extremities well, no edema, no cyanosis, no              redness   Pulses:   Pulses palpable and equal bilaterally   Skin:   No bleeding, bruising or rash            Discharge Disposition: Home          Discharge Medications        Continue These Medications        Instructions Start Date   carvedilol 25 MG tablet  Commonly known as: COREG   25 mg, Oral, 2 Times Daily With Meals      losartan 100 MG " tablet  Commonly known as: COZAAR   100 mg, Oral, Daily      probiotic capsule capsule   1 capsule, Oral, Daily      terazosin 1 MG capsule  Commonly known as: HYTRIN   1 mg, Oral, Nightly             Stop These Medications      Enoxaparin Sodium 100 MG/ML solution prefilled syringe syringe  Commonly known as: LOVENOX     rivaroxaban 20 MG tablet  Commonly known as: XARELTO              Discharge Diet: Resume home diet    Activity at Discharge: activity per Dr. Chong     Follow-up Appointments:  Future Appointments   Date Time Provider Department Center   4/24/2024  4:00 PM Brenda Rodgers MD [facility]   Follow up with Dr. Chong 3/28/24    Dragon disclaimer:  Part of this encounter note is an electronic transcription/translation of spoken language to printed text. The electronic translation of spoken language may permit erroneous, or at times, nonsensical words or phrases to be inadvertently transcribed; Although I have reviewed the note for such errors, some may still exist.       Liv Encinas, CORAL  03/23/24  12:05 EDT    Discharge took greater than 30 minutes

## 2024-03-23 NOTE — OUTREACH NOTE
Prep Survey      Flowsheet Row Responses   [facility] facility patient discharged from? [place]   Is LACE score < 7 ? No   Eligibility Readm Mgmt   Discharge diagnosis s/p Lap with evacuation of peritoneal hematoma   Does the patient have one of the following disease processes/diagnoses(primary or secondary)? General Surgery   Does the patient have Home health ordered? No   Is there a DME ordered? No   Prep survey completed? Yes            ETHAN RAY - Registered Nurse

## 2024-03-24 LAB
BH BB BLOOD EXPIRATION DATE: NORMAL
BH BB BLOOD EXPIRATION DATE: NORMAL
BH BB BLOOD TYPE BARCODE: 600
BH BB BLOOD TYPE BARCODE: 600
BH BB DISPENSE STATUS: NORMAL
BH BB DISPENSE STATUS: NORMAL
BH BB PRODUCT CODE: NORMAL
BH BB PRODUCT CODE: NORMAL
BH BB UNIT NUMBER: NORMAL
BH BB UNIT NUMBER: NORMAL
CROSSMATCH INTERPRETATION: NORMAL
CROSSMATCH INTERPRETATION: NORMAL
UNIT  ABO: NORMAL
UNIT  ABO: NORMAL
UNIT  RH: NORMAL
UNIT  RH: NORMAL

## 2024-03-27 ENCOUNTER — READMISSION MANAGEMENT (OUTPATIENT)
Dept: CALL CENTER | Facility: HOSPITAL | Age: 69
End: 2024-03-27
Payer: MEDICARE

## 2024-03-27 NOTE — OUTREACH NOTE
General Surgery Week 1 Survey      Flowsheet Row Responses   [facility] facility patient discharged from? [place]   Does the patient have one of the following disease processes/diagnoses(primary or secondary)? General Surgery   Week 1 attempt successful? No   Unsuccessful attempts Attempt 1            Kerry BORJAS - Registered Nurse

## 2024-03-28 ENCOUNTER — TELEPHONE (OUTPATIENT)
Dept: CARDIOLOGY | Facility: CLINIC | Age: 69
End: 2024-03-28
Payer: MEDICARE

## 2024-03-28 NOTE — TELEPHONE ENCOUNTER
Patient left voice mail message stating she had recently had surgery and was off of her Xarelto.  She wanted to know when to restart.  Spoke with patient.  She had surgery 3/13/24 for a total hysterectomy and bladder surgery. Her Hgb was 11.6.  She was placed on lovenox at discharge.  Then one week later she was in the ER and she had to have surgery again. Her Hgb at that time was 7.5. After the second surgery on 3/20/24, she was told to stay off of her xarelto for one week.  She took her last dose of lovenox on 3/19/24.  She and Dr. Chong would like to know when to restart her Xarelto.  Her last Hgb was 10.5.  I will discuss with [facility] and get back with her.  She verbalizes understanding.

## 2024-04-01 NOTE — TELEPHONE ENCOUNTER
Discussed with [facility].  Patient to restart xarelto immediately.  If she has another bleed we may consider a watchman.  Spoke with patient.  She is instructed of above and verbalizes understanding.

## 2024-04-03 ENCOUNTER — READMISSION MANAGEMENT (OUTPATIENT)
Dept: CALL CENTER | Facility: HOSPITAL | Age: 69
End: 2024-04-03
Payer: MEDICARE

## 2024-04-03 NOTE — OUTREACH NOTE
General Surgery Week 2 Survey      Flowsheet Row Responses   [facility] patient discharged from? [place]   Does the patient have one of the following disease processes/diagnoses(primary or secondary)? General Surgery   Week 2 attempt successful? No   Unsuccessful attempts Attempt 1   Discharge diagnosis s/p Lap with evacuation of peritoneal hematoma            RASHI KAUFFMAN - Registered Nurse

## 2024-04-09 ENCOUNTER — READMISSION MANAGEMENT (OUTPATIENT)
Dept: CALL CENTER | Facility: HOSPITAL | Age: 69
End: 2024-04-09
Payer: MEDICARE

## 2024-04-09 NOTE — OUTREACH NOTE
General Surgery Week 3 Survey      Flowsheet Row Responses   [facility] facility patient discharged from? [place]   Does the patient have one of the following disease processes/diagnoses(primary or secondary)? General Surgery   Week 3 attempt successful? No   Unsuccessful attempts Attempt 1            Kerry BORJAS - Registered Nurse

## 2024-04-24 ENCOUNTER — OFFICE VISIT (OUTPATIENT)
Dept: CARDIOLOGY | Facility: CLINIC | Age: 69
End: 2024-04-24
Payer: MEDICARE

## 2024-04-24 ENCOUNTER — LAB (OUTPATIENT)
Dept: LAB | Facility: HOSPITAL | Age: 69
End: 2024-04-24
Payer: MEDICARE

## 2024-04-24 VITALS
SYSTOLIC BLOOD PRESSURE: 152 MMHG | WEIGHT: 211.2 LBS | OXYGEN SATURATION: 100 % | HEIGHT: 64 IN | HEART RATE: 75 BPM | BODY MASS INDEX: 36.06 KG/M2 | DIASTOLIC BLOOD PRESSURE: 80 MMHG

## 2024-04-24 DIAGNOSIS — I48.20 ATRIAL FIBRILLATION, CHRONIC: ICD-10-CM

## 2024-04-24 DIAGNOSIS — I48.20 ATRIAL FIBRILLATION, CHRONIC: Primary | ICD-10-CM

## 2024-04-24 DIAGNOSIS — I42.8 NON-ISCHEMIC CARDIOMYOPATHY: ICD-10-CM

## 2024-04-24 DIAGNOSIS — I10 ESSENTIAL HYPERTENSION: ICD-10-CM

## 2024-04-24 DIAGNOSIS — D62 ACUTE BLOOD LOSS ANEMIA: ICD-10-CM

## 2024-04-24 LAB
DEPRECATED RDW RBC AUTO: 44.5 FL (ref 37–54)
ERYTHROCYTE [DISTWIDTH] IN BLOOD BY AUTOMATED COUNT: 14.2 % (ref 12.3–15.4)
HCT VFR BLD AUTO: 32.6 % (ref 34–46.6)
HGB BLD-MCNC: 10.7 G/DL (ref 12–15.9)
MCH RBC QN AUTO: 28 PG (ref 26.6–33)
MCHC RBC AUTO-ENTMCNC: 32.8 G/DL (ref 31.5–35.7)
MCV RBC AUTO: 85.3 FL (ref 79–97)
PLATELET # BLD AUTO: 262 10*3/MM3 (ref 140–450)
PMV BLD AUTO: 9.4 FL (ref 6–12)
RBC # BLD AUTO: 3.82 10*6/MM3 (ref 3.77–5.28)
WBC NRBC COR # BLD AUTO: 5.66 10*3/MM3 (ref 3.4–10.8)

## 2024-04-24 PROCEDURE — 85027 COMPLETE CBC AUTOMATED: CPT

## 2024-04-24 PROCEDURE — 36415 COLL VENOUS BLD VENIPUNCTURE: CPT

## 2024-04-24 PROCEDURE — 3077F SYST BP >= 140 MM HG: CPT | Performed by: PHYSICIAN ASSISTANT

## 2024-04-24 PROCEDURE — 99214 OFFICE O/P EST MOD 30 MIN: CPT | Performed by: PHYSICIAN ASSISTANT

## 2024-04-24 PROCEDURE — 3079F DIAST BP 80-89 MM HG: CPT | Performed by: PHYSICIAN ASSISTANT

## 2024-04-24 NOTE — PROGRESS NOTES
[facility] Cardiology    Patient ID: Maryana Freeman is a 68 y.o. female.  : 1955   Contact: [phone]    Encounter date: 2024    PCP: Astrid Smalls MD      Chief complaint:   Chief Complaint   Patient presents with    Non-ischemic cardiomyopathy     1-Yr F/U       Problem List:  Permanent atrial fibrillation, asymptomatic   Diagnosed , incidentally noted.  Failed ECV, 2014.  Rate-controlled with Coreg.  CHADSVASC 4, on Xarelto.   Non ischemic cardiomyopathy  Echocardiogram 2014: moderate LV dilation, mild to moderately rduced EF of 35-40%, global hypokinesis, left atrial size normal.   Lexiscan Cardiolite 2014: no ischemia.   Started on Coreg, Losartan, 2014.  Echocardiogram,  2020: EF: 55%, mild MR and TR.   Hypertension     Allergies   Allergen Reactions    Wound Dressing Adhesive Dermatitis     blisters       Current Medications:    Current Outpatient Medications:     carvedilol (COREG) 25 MG tablet, TAKE 1 TABLET BY MOUTH TWICE A DAY WITH MEALS, Disp: 180 tablet, Rfl: 3    losartan (COZAAR) 100 MG tablet, TAKE 1 TABLET BY MOUTH EVERY DAY, Disp: 90 tablet, Rfl: 3    probiotic (CULTURELLE) capsule capsule, Take 1 capsule by mouth Daily., Disp: , Rfl:     rivaroxaban (Xarelto) 20 MG tablet, Take 1 tablet by mouth Daily., Disp: , Rfl:     terazosin (HYTRIN) 1 MG capsule, Take 1 capsule by mouth Every Night., Disp: 90 capsule, Rfl: 2    HPI    Maryana Freeman is a 68 y.o. female who presents today for a follow up of AF, NICM, and cardiac risk factors. Since last visit, patient has been doing well overall from a cardiovascular standpoint. She has recently undergone a hysterectomy and notes some vaginal spotting.  She had called our office after this surgery in March due to acute blood loss anemia and holding her Xarelto.  After her symptoms resolved of acute blood loss anemia her Xarelto was restarted without any issue other than the  Complete Pulmonary Function Test completed.   "above-noted spotting.  She denies having her blood work checked since that time.  Patient denies chest pain, shortness of breath, orthopnea, palpitations, edema, dizziness, and syncope.  regularly exercise but does not have any significant chest pain or shortness of breath.      The following portions of the patient's history were reviewed and updated as appropriate: allergies, current medications and problem list.    Pertinent positives as listed in the HPI.  All other systems reviewed are negative.         Vitals:    04/24/24 1132   BP: 152/80   BP Location: Left arm   Patient Position: Sitting   Cuff Size: Adult   Pulse: 75   SpO2: 100%   Weight: 95.8 kg (211 lb 3.2 oz)   Height: 162.6 cm (64\")       Physical Exam:  General: Alert and oriented.  Neck: Jugular venous pressure is within normal limits. Carotids have normal upstrokes without bruits.   Cardiovascular: Heart has a nondisplaced focal PMI. Regular rate and rhythm. No murmur, gallop or rub.  Lungs: Clear, no rales or wheezes. Equal expansion is noted.   Extremities: Show no edema.  Skin: Warm and dry.  Neurologic: Nonfocal.     Diagnostic Data (reviewed with patient):  Lab Results   Component Value Date    GLUCOSE 120 (H) 03/21/2024    BUN 15 03/21/2024    CREATININE 0.75 03/21/2024    BCR 20.0 03/21/2024     (L) 03/21/2024    K 4.4 03/21/2024     03/21/2024    CO2 22.0 03/21/2024    CALCIUM 8.4 (L) 03/21/2024    ALBUMIN 3.7 03/20/2024    ALKPHOS 51 03/20/2024    AST 26 03/20/2024    ALT 28 03/20/2024     Lab Results   Component Value Date    WBC 6.79 03/22/2024    RBC 3.10 (L) 03/22/2024    HGB 8.8 (L) 03/22/2024    HCT 26.5 (L) 03/22/2024    MCV 85.5 03/22/2024     03/22/2024      Advance Care Planning   ACP discussion was held with the patient during this visit. Patient has an advance directive in EMR which is still valid.                Assessment:    ICD-10-CM ICD-9-CM   1. Atrial fibrillation, chronic  I48.20 427.31   2. " Non-ischemic cardiomyopathy  I42.8 425.4   3. Essential hypertension  I10 401.9         Plan:  Given her bleeding on Xarelto Watchman procedure discussed with patient and patient was provided information and will think about it.  She is to contact us back if she wishes to be referred to the electrophysiology team.  CBC ordered to review hemoglobin level after restarting Xarelto  Continue on carvedilol 25 mg BID for hypertension.  Continue on losartan 100 mg daily for hypertension.   Continue on Xarelto 20 mg daily for stroke prophylaxis.   Continue on terazosin 1 mg nightly for fluid retention and hypertension.  Continue all other current medications.  F/up in 12 months, sooner if needed.      Scribed for Fay Myers PA-C by Michael Jenkins. 4/24/2024 11:44 EDT    Fay Myers PA-C

## 2024-08-06 RX ORDER — TERAZOSIN 1 MG/1
1 CAPSULE ORAL NIGHTLY
Qty: 90 CAPSULE | Refills: 3 | Status: SHIPPED | OUTPATIENT
Start: 2024-08-06

## 2024-08-06 NOTE — TELEPHONE ENCOUNTER
Lab Results   Component Value Date    GLUCOSE 120 (H) 03/21/2024    CALCIUM 8.4 (L) 03/21/2024     (L) 03/21/2024    K 4.4 03/21/2024    CO2 22.0 03/21/2024     03/21/2024    BUN 15 03/21/2024    CREATININE 0.75 03/21/2024    EGFR 86.3 03/21/2024    BCR 20.0 03/21/2024    ANIONGAP 9.0 03/21/2024

## 2024-10-29 ENCOUNTER — TRANSCRIBE ORDERS (OUTPATIENT)
Dept: ADMINISTRATIVE | Facility: HOSPITAL | Age: 69
End: 2024-10-29
Payer: MEDICARE

## 2024-10-29 DIAGNOSIS — Z12.31 VISIT FOR SCREENING MAMMOGRAM: Primary | ICD-10-CM

## 2024-11-08 LAB
NCCN CRITERIA FLAG: ABNORMAL
TYRER CUZICK SCORE: 5

## 2024-11-19 ENCOUNTER — HOSPITAL ENCOUNTER (OUTPATIENT)
Dept: MAMMOGRAPHY | Facility: HOSPITAL | Age: 69
Discharge: HOME | End: 2024-11-19
Admitting: INTERNAL MEDICINE
Payer: MEDICARE

## 2024-11-19 DIAGNOSIS — Z12.31 VISIT FOR SCREENING MAMMOGRAM: ICD-10-CM

## 2024-11-19 PROCEDURE — 77067 SCR MAMMO BI INCL CAD: CPT

## 2024-11-19 PROCEDURE — 77063 BREAST TOMOSYNTHESIS BI: CPT

## 2025-03-10 RX ORDER — LOSARTAN POTASSIUM 100 MG/1
100 TABLET ORAL DAILY
Qty: 90 TABLET | Refills: 1 | Status: SHIPPED | OUTPATIENT
Start: 2025-03-10

## 2025-03-12 RX ORDER — CARVEDILOL 25 MG/1
25 TABLET ORAL 2 TIMES DAILY WITH MEALS
Qty: 180 TABLET | Refills: 1 | Status: SHIPPED | OUTPATIENT
Start: 2025-03-12

## 2025-05-07 ENCOUNTER — OFFICE VISIT (OUTPATIENT)
Dept: CARDIOLOGY | Facility: CLINIC | Age: 70
End: 2025-05-07
Payer: MEDICARE

## 2025-05-07 ENCOUNTER — DOCUMENTATION (OUTPATIENT)
Dept: CARDIOLOGY | Facility: CLINIC | Age: 70
End: 2025-05-07
Payer: COMMERCIAL

## 2025-05-07 VITALS
DIASTOLIC BLOOD PRESSURE: 70 MMHG | WEIGHT: 202 LBS | HEIGHT: 64 IN | SYSTOLIC BLOOD PRESSURE: 132 MMHG | OXYGEN SATURATION: 99 % | BODY MASS INDEX: 34.49 KG/M2 | HEART RATE: 80 BPM

## 2025-05-07 DIAGNOSIS — I48.20 ATRIAL FIBRILLATION, CHRONIC: Primary | ICD-10-CM

## 2025-05-07 DIAGNOSIS — I42.8 NON-ISCHEMIC CARDIOMYOPATHY: ICD-10-CM

## 2025-05-07 DIAGNOSIS — I10 ESSENTIAL HYPERTENSION: ICD-10-CM
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
## 2025-06-17 ENCOUNTER — TELEPHONE (OUTPATIENT)
Dept: CARDIOLOGY | Facility: CLINIC | Age: 70
End: 2025-06-17
Payer: COMMERCIAL

## 2025-06-17 NOTE — TELEPHONE ENCOUNTER
Per [facility] - patient is low risk for colonoscopy.  She can hold her xarelto two days prior to procedure.  I will send letter as requested.  Spoke with patient.  She is instructed of above.  She states the physician performing the colonoscopy is Dr. Carlos.  I will send letter to physician.  She verbalizes understanding.

## 2025-06-17 NOTE — TELEPHONE ENCOUNTER
Patient left voice mail message.  She states she is having a colonoscopy and needs to know how long to hold her xarelto.  Spoke with patient.  She states she is having a colonscopy 7/31/25 at [facility] per Dr. Cabezas.  I will discuss with [facility] and call her back.  I will also, send a letter to Dr. Cabezas.  She verbalizes understanding.

## 2025-07-21 RX ORDER — TERAZOSIN 1 MG/1
1 CAPSULE ORAL NIGHTLY
Qty: 90 CAPSULE | Refills: 3 | Status: SHIPPED | OUTPATIENT
Start: 2025-07-21
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
## 2025-07-23 ENCOUNTER — TELEPHONE (OUTPATIENT)
Dept: CARDIOLOGY | Facility: CLINIC | Age: 70
End: 2025-07-23
Payer: MEDICARE

## 2025-07-24 ENCOUNTER — OFFICE VISIT (OUTPATIENT)
Dept: CARDIOLOGY | Facility: CLINIC | Age: 70
End: 2025-07-24
Payer: MEDICARE

## 2025-07-24 VITALS
HEIGHT: 64 IN | OXYGEN SATURATION: 98 % | DIASTOLIC BLOOD PRESSURE: 70 MMHG | SYSTOLIC BLOOD PRESSURE: 144 MMHG | BODY MASS INDEX: 34.04 KG/M2 | WEIGHT: 199.4 LBS | HEART RATE: 64 BPM

## 2025-07-24 DIAGNOSIS — I48.20 ATRIAL FIBRILLATION, CHRONIC: Primary | Chronic | ICD-10-CM

## 2025-07-24 DIAGNOSIS — I42.8 NON-ISCHEMIC CARDIOMYOPATHY: Chronic | ICD-10-CM

## 2025-07-24 DIAGNOSIS — I10 ESSENTIAL HYPERTENSION: Chronic | ICD-10-CM

## 2025-07-24 RX ORDER — POLYETHYLENE GLYCOL 3350, SODIUM SULFATE ANHYDROUS, SODIUM BICARBONATE, SODIUM CHLORIDE, POTASSIUM CHLORIDE 236; 22.74; 6.74; 5.86; 2.97 G/4L; G/4L; G/4L; G/4L; G/4L
POWDER, FOR SOLUTION ORAL SEE ADMIN INSTRUCTIONS
COMMUNITY
Start: 2025-05-09

## (undated) DEVICE — BLADELESS OBTURATOR: Brand: WECK VISTA

## (undated) DEVICE — ENDOPATH PNEUMONEEDLE INSUFFLATION NEEDLES WITH LUER LOCK CONNECTORS 120MM: Brand: ENDOPATH

## (undated) DEVICE — INTENDED FOR TISSUE SEPARATION, AND OTHER PROCEDURES THAT REQUIRE A SHARP SURGICAL BLADE TO PUNCTURE OR CUT.: Brand: BARD-PARKER ® STAINLESS STEEL BLADES

## (undated) DEVICE — GLV SURG PREMIERPRO MIC LTX PF SZ6 BRN

## (undated) DEVICE — APPL HEMO SURG ARISTA/AH/FLEXITIP XL 38CM

## (undated) DEVICE — GLV SURG SENSICARE PI MIC PF SZ6.5 LF STRL

## (undated) DEVICE — HYPODERMIC SAFETY NEEDLE: Brand: MONOJECT

## (undated) DEVICE — ST TBG AIRSEAL FLTR TRI LUM

## (undated) DEVICE — TROC BLADLES ANCHORPORT/OPTI LP 12X120MM 1P/U

## (undated) DEVICE — JELLY,LUBE,STERILE,FLIP TOP,TUBE,2-OZ: Brand: MEDLINE

## (undated) DEVICE — SUT MNCRYL PLS ANTIB UD 4/0 PS2 18IN

## (undated) DEVICE — SCRB SURG BACTOSHIELD CHG 4PCT 4OZ

## (undated) DEVICE — ANTIBACTERIAL UNDYED BRAIDED (POLYGLACTIN 910), SYNTHETIC ABSORBABLE SUTURE: Brand: COATED VICRYL

## (undated) DEVICE — LAP PORT CLOSURE GUIDES 5MM AND 10/12MM: Brand: LAP PORT CLOSURE GUIDES 5MM AND 10/12MM

## (undated) DEVICE — IRRIGATOR BULB ASEPTO 60CC STRL

## (undated) DEVICE — TIP COVER ACCESSORY

## (undated) DEVICE — GLV SURG SIGNATURE TOUCH PF LTX 7 STRL

## (undated) DEVICE — SEAL

## (undated) DEVICE — 2, DISPOSABLE SUCTION/IRRIGATOR WITHOUT DISPOSABLE TIP: Brand: STRYKEFLOW

## (undated) DEVICE — PATIENT RETURN ELECTRODE, SINGLE-USE, CONTACT QUALITY MONITORING, ADULT, WITH 9FT CORD, FOR PATIENTS WEIGING OVER 33LBS. (15KG): Brand: MEGADYNE

## (undated) DEVICE — TRENDELENBURG WINGPAD POSITIONING KIT DELUXE - WITHOUT BODY STRAP: Brand: SOULE MEDICAL

## (undated) DEVICE — APPL CHLORAPREP TINTED 26ML TEAL

## (undated) DEVICE — ADHS SKIN PREMIERPRO EXOFIN TOPICAL HI/VISC .5ML

## (undated) DEVICE — DECANTER BAG 9": Brand: MEDLINE INDUSTRIES, INC.

## (undated) DEVICE — TUBING, SUCTION, 1/4" X 10', STRAIGHT: Brand: MEDLINE

## (undated) DEVICE — LEX VAGINAL HYSTERECTOMY: Brand: MEDLINE INDUSTRIES, INC.

## (undated) DEVICE — SUT VIC 0 TIES 18IN J912G

## (undated) DEVICE — CYSTO/BLADDER IRRIGATION SET, REGULATING CLAMP

## (undated) DEVICE — MANIP UTER RUMI 2 KOH EFFICIENT 2.5CM BL

## (undated) DEVICE — SPONGE,LAP,4"X18",XR,ST,5/PK,40PK/CS: Brand: MEDLINE INDUSTRIES, INC.

## (undated) DEVICE — SNAP KOVER: Brand: UNBRANDED

## (undated) DEVICE — CATH FOL CONT IRR 3WY 16F 5CC

## (undated) DEVICE — PK MAJ GYN DAVINCI 10

## (undated) DEVICE — SUT VICYL PLS CTD ANTIB BR 1 27IN VIL

## (undated) DEVICE — SYR LL TP 10ML STRL

## (undated) DEVICE — LAPAROVUE VISIBILITY SYSTEM LAPAROSCOPIC SOLUTIONS: Brand: LAPAROVUE

## (undated) DEVICE — BLANKT WARM UPPR/BDY ARM/OUT 57X196CM

## (undated) DEVICE — MANIP UTER RUMI TP 5.1MM 6CM LAV

## (undated) DEVICE — LEX GYN MINOR LAPAROSCOPY: Brand: MEDLINE INDUSTRIES, INC.

## (undated) DEVICE — PENCL ROCKRSWCH MEGADYNE W/HOLSTR 10FT SS

## (undated) DEVICE — [HIGH FLOW INSUFFLATOR,  DO NOT USE IF PACKAGE IS DAMAGED,  KEEP DRY,  KEEP AWAY FROM SUNLIGHT,  PROTECT FROM HEAT AND RADIOACTIVE SOURCES.]: Brand: PNEUMOSURE

## (undated) DEVICE — ARM DRAPE

## (undated) DEVICE — MEDI-VAC YANKAUER SUCTION HANDLE W/BULBOUS TIP: Brand: CARDINAL HEALTH

## (undated) DEVICE — COLUMN DRAPE